# Patient Record
Sex: MALE | Race: OTHER | HISPANIC OR LATINO | Employment: FULL TIME | URBAN - METROPOLITAN AREA
[De-identification: names, ages, dates, MRNs, and addresses within clinical notes are randomized per-mention and may not be internally consistent; named-entity substitution may affect disease eponyms.]

---

## 2018-04-18 ENCOUNTER — TRANSCRIBE ORDERS (OUTPATIENT)
Dept: LAB | Facility: HOSPITAL | Age: 48
End: 2018-04-18

## 2018-04-18 ENCOUNTER — APPOINTMENT (OUTPATIENT)
Dept: LAB | Facility: HOSPITAL | Age: 48
End: 2018-04-18
Payer: COMMERCIAL

## 2018-04-18 DIAGNOSIS — Z00.01 ENCOUNTER FOR GENERAL ADULT MEDICAL EXAMINATION WITH ABNORMAL FINDINGS: ICD-10-CM

## 2018-04-18 DIAGNOSIS — I10 ESSENTIAL HYPERTENSION, MALIGNANT: ICD-10-CM

## 2018-04-18 DIAGNOSIS — R94.5 NONSPECIFIC ABNORMAL RESULTS OF LIVER FUNCTION STUDY: ICD-10-CM

## 2018-04-18 DIAGNOSIS — E78.00 PURE HYPERCHOLESTEROLEMIA: ICD-10-CM

## 2018-04-18 DIAGNOSIS — R73.9 BLOOD GLUCOSE ELEVATED: ICD-10-CM

## 2018-04-18 DIAGNOSIS — E78.00 PURE HYPERCHOLESTEROLEMIA: Primary | ICD-10-CM

## 2018-04-18 LAB
ALBUMIN SERPL BCP-MCNC: 3.5 G/DL (ref 3.5–5)
ALP SERPL-CCNC: 71 U/L (ref 46–116)
ALT SERPL W P-5'-P-CCNC: 19 U/L (ref 12–78)
ANION GAP SERPL CALCULATED.3IONS-SCNC: 10 MMOL/L (ref 4–13)
AST SERPL W P-5'-P-CCNC: 16 U/L (ref 5–45)
BASOPHILS # BLD AUTO: 0.03 THOUSANDS/ΜL (ref 0–0.1)
BASOPHILS NFR BLD AUTO: 0 % (ref 0–1)
BILIRUB SERPL-MCNC: 0.42 MG/DL (ref 0.2–1)
BUN SERPL-MCNC: 20 MG/DL (ref 5–25)
CALCIUM SERPL-MCNC: 8.7 MG/DL (ref 8.3–10.1)
CHLORIDE SERPL-SCNC: 106 MMOL/L (ref 100–108)
CHOLEST SERPL-MCNC: 158 MG/DL (ref 50–200)
CO2 SERPL-SCNC: 25 MMOL/L (ref 21–32)
CREAT SERPL-MCNC: 1.03 MG/DL (ref 0.6–1.3)
EOSINOPHIL # BLD AUTO: 0.22 THOUSAND/ΜL (ref 0–0.61)
EOSINOPHIL NFR BLD AUTO: 3 % (ref 0–6)
ERYTHROCYTE [DISTWIDTH] IN BLOOD BY AUTOMATED COUNT: 14.6 % (ref 11.6–15.1)
EST. AVERAGE GLUCOSE BLD GHB EST-MCNC: 117 MG/DL
GFR SERPL CREATININE-BSD FRML MDRD: 85 ML/MIN/1.73SQ M
GLUCOSE P FAST SERPL-MCNC: 82 MG/DL (ref 65–99)
HAV IGM SER QL: NORMAL
HBA1C MFR BLD: 5.7 % (ref 4.2–6.3)
HBV CORE IGM SER QL: NORMAL
HBV SURFACE AG SER QL: NORMAL
HCT VFR BLD AUTO: 44.7 % (ref 36.5–49.3)
HCV AB SER QL: NORMAL
HDLC SERPL-MCNC: 48 MG/DL (ref 40–60)
HGB BLD-MCNC: 14.5 G/DL (ref 12–17)
LDLC SERPL CALC-MCNC: 94 MG/DL (ref 0–100)
LYMPHOCYTES # BLD AUTO: 2.75 THOUSANDS/ΜL (ref 0.6–4.47)
LYMPHOCYTES NFR BLD AUTO: 40 % (ref 14–44)
MCH RBC QN AUTO: 27.8 PG (ref 26.8–34.3)
MCHC RBC AUTO-ENTMCNC: 32.4 G/DL (ref 31.4–37.4)
MCV RBC AUTO: 86 FL (ref 82–98)
MONOCYTES # BLD AUTO: 0.62 THOUSAND/ΜL (ref 0.17–1.22)
MONOCYTES NFR BLD AUTO: 9 % (ref 4–12)
NEUTROPHILS # BLD AUTO: 3.24 THOUSANDS/ΜL (ref 1.85–7.62)
NEUTS SEG NFR BLD AUTO: 48 % (ref 43–75)
NONHDLC SERPL-MCNC: 110 MG/DL
NRBC BLD AUTO-RTO: 0 /100 WBCS
PLATELET # BLD AUTO: 286 THOUSANDS/UL (ref 149–390)
PMV BLD AUTO: 11 FL (ref 8.9–12.7)
POTASSIUM SERPL-SCNC: 3.9 MMOL/L (ref 3.5–5.3)
PROT SERPL-MCNC: 7.6 G/DL (ref 6.4–8.2)
RBC # BLD AUTO: 5.22 MILLION/UL (ref 3.88–5.62)
SODIUM SERPL-SCNC: 141 MMOL/L (ref 136–145)
TRIGL SERPL-MCNC: 81 MG/DL
WBC # BLD AUTO: 6.86 THOUSAND/UL (ref 4.31–10.16)

## 2018-04-18 PROCEDURE — 80061 LIPID PANEL: CPT

## 2018-04-18 PROCEDURE — 83036 HEMOGLOBIN GLYCOSYLATED A1C: CPT

## 2018-04-18 PROCEDURE — 85025 COMPLETE CBC W/AUTO DIFF WBC: CPT

## 2018-04-18 PROCEDURE — 36415 COLL VENOUS BLD VENIPUNCTURE: CPT

## 2018-04-18 PROCEDURE — 80074 ACUTE HEPATITIS PANEL: CPT

## 2018-04-18 PROCEDURE — 80053 COMPREHEN METABOLIC PANEL: CPT

## 2018-06-27 ENCOUNTER — OFFICE VISIT (OUTPATIENT)
Dept: FAMILY MEDICINE CLINIC | Facility: CLINIC | Age: 48
End: 2018-06-27
Payer: COMMERCIAL

## 2018-06-27 VITALS
SYSTOLIC BLOOD PRESSURE: 126 MMHG | DIASTOLIC BLOOD PRESSURE: 80 MMHG | TEMPERATURE: 98.3 F | HEIGHT: 67 IN | OXYGEN SATURATION: 95 % | WEIGHT: 194 LBS | BODY MASS INDEX: 30.45 KG/M2 | RESPIRATION RATE: 16 BRPM | HEART RATE: 72 BPM

## 2018-06-27 DIAGNOSIS — J30.2 SEASONAL ALLERGIC RHINITIS, UNSPECIFIED TRIGGER: Primary | ICD-10-CM

## 2018-06-27 PROCEDURE — 1036F TOBACCO NON-USER: CPT | Performed by: FAMILY MEDICINE

## 2018-06-27 PROCEDURE — 99213 OFFICE O/P EST LOW 20 MIN: CPT | Performed by: FAMILY MEDICINE

## 2018-06-27 PROCEDURE — 3008F BODY MASS INDEX DOCD: CPT | Performed by: FAMILY MEDICINE

## 2018-06-27 RX ORDER — FLUTICASONE PROPIONATE 50 MCG
1 SPRAY, SUSPENSION (ML) NASAL DAILY
Qty: 16 G | Refills: 0 | Status: SHIPPED | OUTPATIENT
Start: 2018-06-27 | End: 2018-12-05 | Stop reason: SDUPTHER

## 2018-06-27 RX ORDER — PREDNISONE 20 MG/1
20 TABLET ORAL DAILY
Qty: 10 TABLET | Refills: 0 | Status: SHIPPED | OUTPATIENT
Start: 2018-06-27 | End: 2019-08-13 | Stop reason: ALTCHOICE

## 2018-06-27 NOTE — PROGRESS NOTES
Assessment/Plan:    No problem-specific Assessment & Plan notes found for this encounter  Diagnoses and all orders for this visit:    Seasonal allergic rhinitis, unspecified trigger  Comments:  Patient is suffering of seasonal allergies  I recommended him to get saline solution to clean his sinus  And use prescribed medication fluticasone /prednisone  Orders:  -     predniSONE 20 mg tablet; Take 1 tablet (20 mg total) by mouth daily  -     fluticasone (FLONASE) 50 mcg/act nasal spray; 1 spray into each nostril daily          Subjective:      Patient ID: Elder Higuera is a 51 y o  male  Pt c/o nasal congestion, sinus pressure and cough for past two weeks  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        The following portions of the patient's history were reviewed and updated as appropriate: allergies, current medications, past family history, past medical history, past social history, past surgical history and problem list     Review of Systems   Constitutional: Positive for fatigue  Negative for activity change and chills  HENT: Positive for congestion, postnasal drip, rhinorrhea, sinus pain, sinus pressure and sore throat  Eyes: Negative for pain, discharge and itching  Respiratory: Positive for cough, shortness of breath and wheezing  Cardiovascular: Negative  Skin: Negative  Allergic/Immunologic: Positive for environmental allergies  Negative for food allergies and immunocompromised state  Neurological: Positive for headaches  Objective:      /80 (BP Location: Left arm, Patient Position: Sitting, Cuff Size: Standard)   Pulse 72   Temp 98 3 °F (36 8 °C) (Oral)   Resp 16   Ht 5' 7" (1 702 m)   Wt 88 kg (194 lb)   SpO2 95%   BMI 30 38 kg/m²          Physical Exam   Constitutional: He is oriented to person, place, and time  He appears well-developed and well-nourished  HENT:   Head: Normocephalic     Right Ear: Hearing normal    Left Ear: Tympanic membrane normal    Nose: Mucosal edema and rhinorrhea present  No nasal deformity, septal deviation or nasal septal hematoma  Mouth/Throat: Mucous membranes are normal  Oropharyngeal exudate present  Eyes: Pupils are equal, round, and reactive to light  Right eye exhibits no discharge  Left eye exhibits no discharge  Neck: Normal range of motion  Cardiovascular: Normal rate and regular rhythm  Pulmonary/Chest: Effort normal and breath sounds normal  No respiratory distress  He has no wheezes  Neurological: He is alert and oriented to person, place, and time  He has normal reflexes  Skin: Skin is warm and dry

## 2018-12-05 ENCOUNTER — OFFICE VISIT (OUTPATIENT)
Dept: FAMILY MEDICINE CLINIC | Facility: CLINIC | Age: 48
End: 2018-12-05
Payer: COMMERCIAL

## 2018-12-05 VITALS
OXYGEN SATURATION: 97 % | HEART RATE: 64 BPM | DIASTOLIC BLOOD PRESSURE: 80 MMHG | WEIGHT: 196.4 LBS | TEMPERATURE: 96.8 F | SYSTOLIC BLOOD PRESSURE: 130 MMHG | RESPIRATION RATE: 18 BRPM | BODY MASS INDEX: 29.77 KG/M2 | HEIGHT: 68 IN

## 2018-12-05 DIAGNOSIS — J01.01 ACUTE RECURRENT MAXILLARY SINUSITIS: Primary | ICD-10-CM

## 2018-12-05 DIAGNOSIS — J30.2 SEASONAL ALLERGIC RHINITIS, UNSPECIFIED TRIGGER: ICD-10-CM

## 2018-12-05 DIAGNOSIS — Z23 NEEDS FLU SHOT: ICD-10-CM

## 2018-12-05 PROCEDURE — 90471 IMMUNIZATION ADMIN: CPT

## 2018-12-05 PROCEDURE — 99213 OFFICE O/P EST LOW 20 MIN: CPT | Performed by: NURSE PRACTITIONER

## 2018-12-05 PROCEDURE — 90682 RIV4 VACC RECOMBINANT DNA IM: CPT

## 2018-12-05 PROCEDURE — 3008F BODY MASS INDEX DOCD: CPT | Performed by: NURSE PRACTITIONER

## 2018-12-05 RX ORDER — AMOXICILLIN AND CLAVULANATE POTASSIUM 875; 125 MG/1; MG/1
1 TABLET, FILM COATED ORAL EVERY 12 HOURS SCHEDULED
Qty: 14 TABLET | Refills: 0 | Status: SHIPPED | OUTPATIENT
Start: 2018-12-05 | End: 2018-12-12

## 2018-12-05 RX ORDER — FLUTICASONE PROPIONATE 50 MCG
1 SPRAY, SUSPENSION (ML) NASAL DAILY
Qty: 16 G | Refills: 0 | Status: SHIPPED | OUTPATIENT
Start: 2018-12-05 | End: 2019-08-13 | Stop reason: ALTCHOICE

## 2018-12-05 NOTE — PATIENT INSTRUCTIONS
Rinosinusitis   LO QUE NECESITA SABER:   ¿Qué es la rinosinusitis? La rinosinusitis (RS) es la inflamación de yeni fosas nasales y senos paranasales  Por lo general, comienza lety un virus, frecuentemente lety un resfriado común  Los virus normalmente richardson entre 7 y 8 días y no necesitan tratamiento  Cuando el virus no se mejora por sí solo, usted podría tener rinosinusitis bacteriana  [place] significa que ha comenzado a crecer bacteria dentro de yeni senos paranasales  La RS aguda dura menos de 4 semanas  La RS crónica dura más de 12 semanas  La RS recurrente es cuando usted tiene 4 o más episodios de rinosinusitis en 1 año  ¿Cuáles son los signos y síntomas de la rinosinusitis? Yeni signos y síntomas podrían empeorar cuando usted se [place] sobre schwartz espalda o cuando trata de dormir  Puede presentar cualquiera de los siguientes signos o síntomas:  · Congestión nasal y pérdida del sentido del olfato    · Flujo nasal espeso con mucosidad amarilla o desean    · Sensación de presión o dolor en schwartz jewel, o dolor de reba    · Dolor en los dientes o mal aliento     · Dolor o presión en el oído     · Fiebre o tos    · Cansancio  ¿Cómo se diagnostica la rinosinusitis? Schwartz médico le palpará los senos paranasales y le revisará yeni ojos, [place], saraia y oídos  Le preguntará acerca de yeni síntomas y por cuánto Nikhil and Sims goldstein tenido  Infórmele si yeni síntomas caraballo kaden o empeorado desde que empezaron  Es posible que [place] de mucosidad de schwartz nariz muestre qué germen está provocando schwartz infección  Si usted tiene RS crónica, es posible que necesite exámenes de imágenes  ¿Cómo se trata la rinosinusitis? Yeni síntomas usualmente desaparecen por [employer]  Es posible que usted necesite alguno de los siguientes:  · El acetaminofén  gayle el dolor y baja la fiebre  Está disponible sin receta médica  Pregunte la cantidad y la frecuencia con que debe tomarlos  [address]   El acetaminofén puede causar daño en el hígado cuando no se bharat de forma correcta  · AINEs (Analgésicos antiinflamatorios no esteroides) lety el ibuprofeno, ayudan a disminuir la inflamación, el dolor y la [place]  Mechelle medicamento esta disponible con o sin aracelis receta médica  Los AINEs pueden causar sangrado estomacal o problemas renales en ciertas personas  Si usted bharat un medicamento anticoagulante, siempre pregúntele a orona médico si los RONNI son seguros para usted  Siempre uriel la etiqueta de mechelle medicamento y [place] instrucciones  · Los aerosoles nasales con esteroides  disminuyen la inflamación de orona nariz y de los senos paranasales  · Los descongestionantes  reducen la inflamación y despejan la mucosidad de la nariz y los senos paranasales  Los descongestionantes podrían ayudarle a respirar más fácilmente  · Los antihistamínicos  secar la mucosidad en orona nariz y [employer] estornudos  · Antibióticos  sirven para tratar aracelis infección bacteriana y se podrían necesitar si los síntomas no mejoran o se [place]  ¿Cómo puedo controlar los síntomas? · Enjuague yeni senos paranasales  Use un aparato para enjuagar yeni fosas nasales con aracelis solución salina (agua salada)  [place] ayudará a diluir la mucosidad en la nariz eliminando el polen y la suciedad  También ayudará a reducir la inflamación para que usted pueda respirar normalmente  Pregunte a orona médico con qué frecuencia hacerlo  · Respire vapor  [place] agua en un sartén hasta que salga vapor  Inclínese sobre el cuenco y amauri aracelis carpa con aracelis toalla douglas  Respire profundamente por aproximadamente 20 minutos  Tenga cuidado de no acercarse demasiado al vapor o de quemarse  Amauri esto 3 veces al día  Usted también puede respirar profundamente mientras bharat aracelis ducha caliente  · Duerma con la [place]  Coloque aracelis almohada adicional debajo de orona reba antes de dormir para ayudar a drenar yeni senos paranasales  · [address] se le haya indicado    Pregunte a rodriguez médico sobre la cantidad de líquido que necesita belinda todos los días y cuáles le recomienda  Los líquidos van a diluir la mucosidad en rodriguez [facility] y [facility] a drenarla  Evite las bebidas que contienen alcohol o cafeína  · No fume y evite el humo de [place]  La nicotina y otros químicos en los cigarrillos y cigarros pueden empeorar judi síntomas  Pida información a rodriguez médico si usted actualmente fuma y necesita ayuda para dejar de fumar  Los cigarrillos electrónicos o tabaco sin humo todavía contienen nicotina  Consulte con rodriguez médico antes de [employer]  ¿Cuándo westley buscar atención inmediata? · Rodriguez sowmya y párpado están enrojecidos, inflamados y adoloridos  · Usted no puede abrir rodriguez sowmya  · Usted tiene visión doble o no ve  · Rodriguez globo ocular sobresale o usted no puede  rodriguez sowmya  · Usted tiene más sueño de lo normal o nota cambios en rodriguez habilidad de pensar, moverse o hablar  · Usted tiene el noble rígido, fiebre o un shay dolor de reba  · Usted tiene inflamada la frente o el cuero cabelludo  ¿Cuándo west[place] comunicarme con mi médico?   · Judi síntomas empeoran o no mejoran después de 3 a 5 días de [place]  · Usted tiene preguntas o inquietudes acerca de rodriguez condición o cuidado  ACUERDOS SOBRE RODRIGUEZ CUIDADO:   Usted tiene el derecho de ayudar a planear rodriguez cuidado  Aprenda todo lo que pueda sobre rodriguez condición y lety darle tratamiento  Discuta judi opciones de tratamiento con judi médicos para decidir el cuidado que usted desea recibir  Usted siempre tiene el derecho de rechazar el tratamiento  Esta información es sólo para uso en educación  Rodriguez intención no es darle un consejo médico sobre enfermedades o tratamientos  Colsulte con rodriguez Bernadine Davis farmacéutico antes de seguir cualquier régimen médico para saber si es seguro y efectivo para usted    © 2017 [address] Information is for End User's use only and may not be sold, redistributed or otherwise used for commercial purposes  All illustrations and images included in CareNotes® are the copyrighted property of A D A M , Inc  or Damián Blair

## 2018-12-05 NOTE — PROGRESS NOTES
Assessment/Plan:    Acute recurrent maxillary sinusitis  I am prescribing an antibiotic along with Flonase nasal spray  Pt recommended to use neti pot to help with sinus pressure  Also recommended use of humidifier at night to ease with breathing  recommend analgesics, topical intranasal steroids, and/or nasal saline irrigation for symptomatic relief of viral rhinosinusitis  Patient to return to clinic if s/s do not improve  Diagnoses and all orders for this visit:    Acute recurrent maxillary sinusitis  -     amoxicillin-clavulanate (AUGMENTIN) 875-125 mg per tablet; Take 1 tablet by mouth every 12 (twelve) hours for 7 days    Seasonal allergic rhinitis, unspecified trigger  Comments:  Patient is suffering of seasonal allergies  I recommended him to get saline solution to clean his sinus  And use prescribed medication fluticasone /prednisone  Orders:  -     fluticasone (FLONASE) 50 mcg/act nasal spray; 1 spray into each nostril daily    Needs flu shot  -     PREFERRED: influenza vaccine, [id number], quadrivalent, recombinant, PF, 0 5 mL (FLUBLOK)          Subjective:      Patient ID: Patti Escobar is a 51 y o  male  51year old male patient presents today for a sick visit  States he suffers from sinus infections  Congested nasally but denies headaches  Admits to maxillary sinus pressure  Denies a cough or a fever  Denies ear congestion  Admits to post nasal drip but denies sore throat  This weather makes his sinuses recur  Currently using over the counter nasal spray  Ongoing for more than a week  The following portions of the patient's history were reviewed and updated as appropriate: allergies, current medications, past family history, past medical history, past social history, past surgical history and problem list     Review of Systems   Constitutional: Negative  Negative for fatigue and fever  HENT: Positive for congestion, postnasal drip, sinus pain and sinus pressure   Negative for sore throat and trouble swallowing  Eyes: Negative  Respiratory: Negative  Negative for cough and shortness of breath  Cardiovascular: Negative  Negative for chest pain and palpitations  Gastrointestinal: Negative  Endocrine: Negative  Musculoskeletal: Negative  Psychiatric/Behavioral: Negative  Objective:      /80   Pulse 64   Temp (!) 96 8 °F (36 °C) (Temporal)   Resp 18   Ht 5' 8" (1 727 m)   Wt 89 1 kg (196 lb 6 4 oz)   SpO2 97%   BMI 29 86 kg/m²          Physical Exam   Constitutional: He is oriented to person, place, and time  He appears well-developed and well-nourished  HENT:   Head: Normocephalic and atraumatic  Right Ear: Hearing, tympanic membrane, external ear and ear canal normal    Left Ear: Hearing, tympanic membrane, external ear and ear canal normal    Nose: Mucosal edema present  Right sinus exhibits maxillary sinus tenderness  Right sinus exhibits no frontal sinus tenderness  Left sinus exhibits maxillary sinus tenderness  Left sinus exhibits no frontal sinus tenderness  Mouth/Throat: Uvula is midline and mucous membranes are normal  Posterior oropharyngeal erythema present  Cardiovascular: Normal rate, regular rhythm and normal heart sounds  Pulmonary/Chest: Effort normal and breath sounds normal  No respiratory distress  Abdominal: Soft  Bowel sounds are normal    Neurological: He is alert and oriented to person, place, and time  He has normal reflexes  Skin: Skin is warm and dry  Psychiatric: He has a normal mood and affect  Thought content normal    Nursing note and vitals reviewed

## 2018-12-05 NOTE — ASSESSMENT & PLAN NOTE
I am prescribing an antibiotic along with Flonase nasal spray  Pt recommended to use neti pot to help with sinus pressure  Also recommended use of humidifier at night to ease with breathing  recommend analgesics, topical intranasal steroids, and/or nasal saline irrigation for symptomatic relief of viral rhinosinusitis  Patient to return to clinic if s/s do not improve

## 2019-08-13 ENCOUNTER — OFFICE VISIT (OUTPATIENT)
Dept: FAMILY MEDICINE CLINIC | Facility: CLINIC | Age: 49
End: 2019-08-13
Payer: COMMERCIAL

## 2019-08-13 VITALS
TEMPERATURE: 98 F | SYSTOLIC BLOOD PRESSURE: 120 MMHG | HEART RATE: 67 BPM | DIASTOLIC BLOOD PRESSURE: 70 MMHG | BODY MASS INDEX: 28.64 KG/M2 | HEIGHT: 68 IN | WEIGHT: 189 LBS | RESPIRATION RATE: 16 BRPM | OXYGEN SATURATION: 97 %

## 2019-08-13 DIAGNOSIS — M54.50 ACUTE BILATERAL LOW BACK PAIN WITHOUT SCIATICA: Primary | ICD-10-CM

## 2019-08-13 PROCEDURE — 1036F TOBACCO NON-USER: CPT | Performed by: FAMILY MEDICINE

## 2019-08-13 PROCEDURE — 3008F BODY MASS INDEX DOCD: CPT | Performed by: FAMILY MEDICINE

## 2019-08-13 PROCEDURE — 99213 OFFICE O/P EST LOW 20 MIN: CPT | Performed by: FAMILY MEDICINE

## 2019-08-13 RX ORDER — DICLOFENAC SODIUM 75 MG/1
75 TABLET, DELAYED RELEASE ORAL 2 TIMES DAILY
Qty: 30 TABLET | Refills: 0 | Status: SHIPPED | OUTPATIENT
Start: 2019-08-13 | End: 2019-09-25 | Stop reason: SDUPTHER

## 2019-08-13 NOTE — PROGRESS NOTES
Assessment/Plan:  1  Acute bilateral low back pain without sciatica  The choice of NSAID's in this patient depends of her current pain syndrome  I explained to patient that response of NSAIDs varies between patient's and also is differs in regarding to the area of the body that is affected in the progression of the damage  The drug interactions and comorbidities affected  by these medications were explained to the patient also; the caution in toxicity in the GI tract was also discussed  Prevent the long-term use of these medications may be wise  The use ice and physical therapy is necessary in order to get the best results  NSAID's might elevate BP, or block effect of certain antihypertensive agents  It is to used with caution  Always use ice and therapy to decrease or avoid the need for a Pharmacological agent  - diclofenac (VOLTAREN) 75 mg EC tablet; Take 1 tablet (75 mg total) by mouth 2 (two) times a day  Dispense: 30 tablet; Refill: 0  BMI Counseling: Body mass index is 28 74 kg/m²  The BMI is above normal  Exercise recommendations include exercising 3-5 times per week  No problem-specific Assessment & Plan notes found for this encounter  Diagnoses and all orders for this visit:    Acute bilateral low back pain without sciatica  -     diclofenac (VOLTAREN) 75 mg EC tablet; Take 1 tablet (75 mg total) by mouth 2 (two) times a day          Subjective:      Patient ID: Mckenzie Perea is a 47 y o  male  Mckenzie Perea 47 y o  complaining of   Acute pain in lower back; started about 2 week(s) ago  The pain is  continuous and has been gradually worsening since onset  The quality of the pain is described as aching  The pain does radiate down both buttocks  The pain is at a severity of 5/10  The symptoms are aggravated by movement  Associated symptoms include numbness, paresthesias and tingling  he does not have  tried any medications           The following portions of the patient's history were reviewed and updated as appropriate: allergies, current medications, past family history, past medical history, past social history, past surgical history and problem list     Review of Systems   Constitutional: Negative for diaphoresis, fatigue, fever and unexpected weight change  Respiratory: Negative for apnea, cough, choking, chest tightness and shortness of breath  Cardiovascular: Negative for chest pain, palpitations and leg swelling  Gastrointestinal: Negative for abdominal distention, abdominal pain, anal bleeding, blood in stool and constipation  Musculoskeletal: Positive for back pain (Worsening a work, he works as  doing lot of bendings  )  Negative for arthralgias, gait problem and joint swelling  Neurological: Negative for dizziness, facial asymmetry, light-headedness and headaches  Psychiatric/Behavioral: Negative for behavioral problems, dysphoric mood and self-injury  The patient is not nervous/anxious  Objective:      /70 (BP Location: Left arm, Patient Position: Sitting, Cuff Size: Standard)   Pulse 67   Temp 98 °F (36 7 °C) (Oral)   Resp 16   Ht 5' 8" (1 727 m)   Wt 85 7 kg (189 lb)   SpO2 97%   BMI 28 74 kg/m²          Physical Exam   Constitutional: No distress  HENT:   Nose: Nose normal    Mouth/Throat: Oropharynx is clear and moist    Eyes: Pupils are equal, round, and reactive to light  Conjunctivae are normal    Neck: Normal range of motion  No thyromegaly present  Cardiovascular: Normal rate, regular rhythm and normal heart sounds  Exam reveals no friction rub  No murmur heard  Pulmonary/Chest: Effort normal and breath sounds normal  No stridor  No respiratory distress  Musculoskeletal: He exhibits no edema, tenderness or deformity  Lumbar back: He exhibits spasm (Any increased heat in lower area  )  Neurological: He displays normal reflexes  No cranial nerve deficit  He exhibits normal muscle tone   Coordination normal    Skin: He is not diaphoretic

## 2019-08-13 NOTE — PATIENT INSTRUCTIONS
Diclofenac (Por la boca)   Trata el dolor  También los lucero de [place]  Mechelle es un medicamento antiinflamatorio no esteroideo (RONNI)  Gabino(s) : Cambia, Cataflam, DermaSilkRx Anodynexa [place], DermaSilkRx Diclo [place], DermacinRx Seth, Cumeira, NuDiclo TabPAK, PrevidolRx Analgesic [place], PrevidolRx Plus Analgesic [place], Voltaren, Zipsor, Zorvolex   Existen muchas otras marcas de [employer]  Mechelle medicamento no debe ser usado cuando:   Mechelle medicamento no es adecuado para todas las personas  No lo use si usted ha tenido aracelis reacción alérgica al diclofenaco, a la aspirina o a algún otro analgésico RONNI  Forma de usar mechelle medicamento:   Leona Cintron, Jayde Holt recubierta, Tableta de liberación prolongada  · Schwartz médico le indicara cuanto medicamento necesita usar  No use más medicamento de lo indicado  · [employer] debe venir con Jen Otero del medicamento  Solicite aracelis copia con schwartz farmacéutico en [place] de no tener la guía  · Solución oral: Mezcle el contenido del paquete con aracelis 1 a 2 onzas de agua (30 a 60 mL)  No use ningún otro líquido que no sea [place] agua para mezclar el medicamento  Mezcle javed y michael de inmediato con el estómago vacío  · Si olvida aracelis dosis: Si olvida aracelis dosis de schwartz medicamento, tómelo lo más pronto posible  Si es ramiro la hora para schwartz próxima dosis, espere hasta entonces para belinda schwartz dosis regular  No use medicamento adicional para reponer la dosis olvidada  · Guarde el medicamento en un recipiente cerrado a temperatura ambiente y alejado del calor, la humedad y la jas directa     Medicamentos y alimentos que debe evitar:   Consulte con schwartz médico o farmacéutico antes de usar cualquier medicamento, incluyendo los que compra sin receta médica, las vitaminas y los productos herbales  · No use ningún otro RONNI a no ser que schwartz médico lo autorice   Algunos otros nombres para estos medicamentos analgésico son:aspirina, diflunisal, ibuprofeno, naproxeno o salsalato  · Algunos alimentos y medicamentos pueden afectar la efectividad del diclofenaco  Informe a u médico si usted también está usando alguno de los siguientes medicamentos:  ¨ Ciclosporina, digoxina, litio, metotrexato, pemetrexed  ¨ Medicamentos para la presión arterial  ¨ Anticoagulante (lety la warfarina)  ¨ Diurético  ¨ Medicamento para tratar la depresión  ¨ Medicamentos esteroideos  Precauciones marco el uso de mechelle medicamento:   · Informe a orona médico si está embarazada o amamantando  No use mechelle medicamento marco la última etapa del embarazo a menos que sea indicado por orona médico   · Infórmele a orona médico si usted tiene enfermedad en los riñones o hígado, asma, insuficiencia cardíaca, presión arterial shannon o problemas con los vasos sanguíneo o un historial de úlceras estomacal o problemas de sangrado  Informe a orona médico si usted tiene fenilcetonuria (PKU)  Infórmele también a orona médico si usted bharat alcohol  · Mechelle medicamento puede causar los siguientes problemas:  ¨ Mayor riesgo de sufrir un ataque cardíaco, insuficiencia cardíaca o derrame cerebral  ¨ Sangrado estomacal o intestinal  ¨ Problemas hepáticos  ¨ Reacciones graves en la piel  · Los lucero de reba podrían empeorar si usted [place] medicamentos para el dolor de reba marco 10 neida o más por mes  Anote la frecuencia de yeni lucero de reba y la frecuencia con la que usted [place] mechelle medicamento  · El médico solicitará exámenes de laboratorio marco las citas de rutina para revisar los efectos de Jennifer  Asista a todas yeni citas  · Guarde todos los medicamentos fuera del alcance de los niños  Nunca comparta yeni medicamentos con [facility]    Efectos secundarios que pueden presentarse marco el uso de mechelle medicamento:   Consulte inmediatamente con el médico si nota cualquiera de estos efectos secundarios:  · Reacción alérgica: Comezón o ronchas, hinchazón del priti o las edd, hinchazón u hormigueo en la boca o garganta, opresión en el pecho, dificultad para respirar  · Ampollas, despelleje, o sarpullido florian en la piel  · Heces con sanjuana o negras y alquitranadas, shay dolor de BJ[place]OLM, vomita sanjuana o material que parecen granos de café  · [employer] en la cantidad y frecuencia con la que orina  · Dolor en el pecho que podría propagarse a yeni brazos, [place], espalda o noble, presentar dificultad para respirar, sudor inusual, desmayos  · [employer] o heces pálidas, náuseas, vómitos, falta de apetito, dolor estomacal, coloración amarillenta en la piel u ojos  · Adormecimiento o debilidad en schwartz brazo o pierna, o en un lado de schwartz cuerpo, dolor en la parte inferior de schwartz pierna, dolor de reba shay o súbito o problemas con la visión, el habla, o para caminar  · Aumento rápido de peso, inflamación en yeni edd, tobillos, o pies  Consulte con el médico si nota los siguientes efectos secundarios menos graves:   · Estreñimiento o diarrea  · Dolor de reba moderado  Consulte con el médico si nota otros efectos secundarios que sabina son causados por lesley medicamento  Llame a schwartz médico para consultarle López Rodriguez puede notificar yeni efectos secundarios al FDA al [id number]  © 2017 [address] Aguilar Information is for End User's use only and may not be sold, redistributed or otherwise used for commercial purposes  Esta información es sólo para uso en educación  Schwartz intención no es darle un consejo médico sobre enfermedades o tratamientos  Colsulte con schwartz [employer] farmacéutico antes de seguir cualquier régimen médico para saber si es seguro y efectivo para usted

## 2019-09-25 ENCOUNTER — OFFICE VISIT (OUTPATIENT)
Dept: FAMILY MEDICINE CLINIC | Facility: CLINIC | Age: 49
End: 2019-09-25
Payer: COMMERCIAL

## 2019-09-25 VITALS
BODY MASS INDEX: 29.55 KG/M2 | OXYGEN SATURATION: 97 % | DIASTOLIC BLOOD PRESSURE: 70 MMHG | SYSTOLIC BLOOD PRESSURE: 120 MMHG | WEIGHT: 195 LBS | RESPIRATION RATE: 16 BRPM | HEIGHT: 68 IN | HEART RATE: 81 BPM | TEMPERATURE: 98.2 F

## 2019-09-25 DIAGNOSIS — M54.50 ACUTE BILATERAL LOW BACK PAIN WITHOUT SCIATICA: ICD-10-CM

## 2019-09-25 DIAGNOSIS — Z23 NEEDS FLU SHOT: ICD-10-CM

## 2019-09-25 DIAGNOSIS — Z12.11 COLON CANCER SCREENING: ICD-10-CM

## 2019-09-25 DIAGNOSIS — Z00.01 ENCOUNTER FOR GENERAL ADULT MEDICAL EXAMINATION WITH ABNORMAL FINDINGS: Primary | ICD-10-CM

## 2019-09-25 DIAGNOSIS — I83.93 ASYMPTOMATIC VARICOSE VEINS OF BOTH LOWER EXTREMITIES: ICD-10-CM

## 2019-09-25 PROCEDURE — 99396 PREV VISIT EST AGE 40-64: CPT | Performed by: FAMILY MEDICINE

## 2019-09-25 PROCEDURE — 90682 RIV4 VACC RECOMBINANT DNA IM: CPT | Performed by: FAMILY MEDICINE

## 2019-09-25 PROCEDURE — 90471 IMMUNIZATION ADMIN: CPT | Performed by: FAMILY MEDICINE

## 2019-09-25 RX ORDER — DICLOFENAC SODIUM 75 MG/1
75 TABLET, DELAYED RELEASE ORAL 2 TIMES DAILY
Qty: 60 TABLET | Refills: 0 | Status: SHIPPED | OUTPATIENT
Start: 2019-09-25 | End: 2020-10-02 | Stop reason: ALTCHOICE

## 2019-09-25 NOTE — ASSESSMENT & PLAN NOTE
During this visit we have a goal to personalize prevention  I discussed the patient about - chronic back pain-, and he takes the) act off and on, requesting medication for the acute exacerbations,  the need for a life style plan and decrease the impact of current problems  Health risk assessment was discussed with patient also and the ways to stay healthier  We reviewed also the current medications, the need to avoid polypharmacy in his current treatment; also about how the chronic conditions are impacting now and later  Recommended a healthy diet and exercising frequently will help to control better patient's current chronic conditions;  Immunizations, and the need to compliance with current CDC's recommendations  Patient declined at this time advanced directives  I encouraged against the use alcohol, tobacco, recreational illegal prescribed and non-prescribed drugs, Smoking status Not applicable and the use of cell phone while driving, safe sex

## 2019-09-25 NOTE — PROGRESS NOTES
Assessment/Plan:    Colon cancer screening  During this visit we have a goal to personalize prevention  I discussed the patient about - chronic back pain-, and he takes the) act off and on, requesting medication for the acute exacerbations,  the need for a life style plan and decrease the impact of current problems  Health risk assessment was discussed with patient also and the ways to stay healthier  We reviewed also the current medications, the need to avoid polypharmacy in his current treatment; also about how the chronic conditions are impacting now and later  Recommended a healthy diet and exercising frequently will help to control better patient's current chronic conditions;  Immunizations, and the need to compliance with current CDC's recommendations  Patient declined at this time advanced directives  I encouraged against the use alcohol, tobacco, recreational illegal prescribed and non-prescribed drugs, Smoking status Not applicable and the use of cell phone while driving, safe sex  Diagnoses and all orders for this visit:    Encounter for general adult medical examination with abnormal findings  -     Comprehensive metabolic panel; Future  -     Lipid panel; Future    Needs flu shot  -     influenza vaccine, [id number], quadrivalent, recombinant, PF, 0 5 mL, for patients 18 yr+ (FLUBLOK)    Colon cancer screening  -     Ambulatory referral to Gastroenterology; Future    Acute bilateral low back pain without sciatica  -     diclofenac (VOLTAREN) 75 mg EC tablet; Take 1 tablet (75 mg total) by mouth 2 (two) times a day    Asymptomatic varicose veins of both lower extremities    Other orders  -     Cancel: CBC and differential; Future  -     Cancel: HEMOGLOBIN A1C W/ EAG ESTIMATION; Future          Subjective:      Patient ID: Juanita Rice is a 47 y o  male  Patient is here for PE, not having any acute distress        The following portions of the patient's history were reviewed and updated as appropriate: allergies, current medications, past family history, past medical history, past social history, past surgical history and problem list     Review of Systems   Constitutional: Negative for chills, diaphoresis, fatigue and fever  HENT: Negative for hearing loss, sinus pressure, sore throat and trouble swallowing  Eyes: Negative for photophobia, pain, redness and visual disturbance  Respiratory: Negative for cough, choking, chest tightness and shortness of breath  Cardiovascular: Negative for chest pain, palpitations and leg swelling  Gastrointestinal: Negative for abdominal pain  Genitourinary: Negative for difficulty urinating, dysuria, enuresis and flank pain  Musculoskeletal: Negative for arthralgias, back pain, gait problem and joint swelling  Neurological: Negative for dizziness, facial asymmetry, light-headedness and headaches  Psychiatric/Behavioral: Negative for agitation, behavioral problems, confusion and decreased concentration  Objective:      /70 (BP Location: Left arm, Patient Position: Sitting, Cuff Size: Standard)   Pulse 81   Temp 98 2 °F (36 8 °C) (Oral)   Resp 16   Ht 5' 8" (1 727 m)   Wt 88 5 kg (195 lb)   SpO2 97%   BMI 29 65 kg/m²          Physical Exam   Constitutional: No distress  HENT:   Nose: Nose normal    Mouth/Throat: Oropharynx is clear and moist    Eyes: Pupils are equal, round, and reactive to light  Conjunctivae are normal    Neck: Normal range of motion  No thyromegaly present  Cardiovascular: Normal rate, regular rhythm and normal heart sounds  Exam reveals no friction rub  No murmur heard  Pulmonary/Chest: Effort normal and breath sounds normal  No stridor  No respiratory distress  Abdominal: Soft  Bowel sounds are normal    Genitourinary: Prostate normal    Musculoskeletal: He exhibits no edema, tenderness or deformity  Neurological: He is alert  He displays normal reflexes  No cranial nerve deficit   He exhibits normal muscle tone  Coordination normal    Skin: He is not diaphoretic     Evidence of venous insufficiency in both lower extremities

## 2019-09-25 NOTE — PATIENT INSTRUCTIONS
Diclofenac (Por la boca)   Trata el dolor  También los lucero de [place]  Mechelle es un medicamento antiinflamatorio no esteroideo (RONNI)  Gabino(s) : Cambia, Cataflam, DermaSilkRx Anodynexa [place], DermaSilkRx Diclo [place], DermacinRx Seth, Cumeira, NuDiclo TabPAK, PrevidolRx Analgesic [place], PrevidolRx Plus Analgesic [place], Voltaren, Zipsor, Zorvolex   Existen muchas otras marcas de Jennifer  Mechelle medicamento no debe ser usado cuando:   Mechelle medicamento no es adecuado para todas las personas  No lo use si usted ha tenido aracelis reacción alérgica al diclofenaco, a la aspirina o a algún otro analgésico RONNI  Forma de usar mechelle medicamento:   Orma Fan, Forest Lent recubierta, Tableta de liberación prolongada  · Orona médico le indicara cuanto medicamento necesita usar  No use más medicamento de lo indicado  · Jennifer debe venir con [facility] Guía del medicamento  Solicite aracelis copia con orona farmacéutico en [place] de no tener la guía  · Solución oral: Mezcle el contenido del paquete con aracelis 1 a 2 onzas de agua (30 a 60 mL)  No use ningún otro líquido que no sea [place] agua para mezclar el medicamento  Mezcle javed y michael de inmediato con el estómago vacío  · Si olvida aracelis dosis: Si olvida aracelis dosis de orona medicamento, tómelo lo más pronto posible  Si es ramiro la hora para orona próxima dosis, espere hasta entonces para belinda orona dosis regular  No use medicamento adicional para reponer la dosis olvidada  · Guarde el medicamento en un recipiente cerrado a temperatura ambiente y alejado del calor, la humedad y la jas directa     Medicamentos y alimentos que debe evitar:   Consulte con orona médico o farmacéutico antes de usar cualquier medicamento, incluyendo los que compra sin receta médica, las vitaminas y los productos herbales  · No use ningún otro RONNI a no ser que orona médico lo autorice   Algunos otros nombres para estos medicamentos analgésico son:aspirina, diflunisal, ibuprofeno, naproxeno o salsalato  · Algunos alimentos y medicamentos pueden afectar la efectividad del diclofenaco  Informe a u médico si usted también está usando alguno de los siguientes medicamentos:  ¨ Ciclosporina, digoxina, litio, metotrexato, pemetrexed  ¨ Medicamentos para la presión arterial  ¨ Anticoagulante (lety la warfarina)  ¨ Diurético  ¨ Medicamento para tratar la depresión  ¨ Medicamentos esteroideos  Precauciones marco el uso de mechelle medicamento:   · Informe a orona médico si está embarazada o amamantando  No use mechelle medicamento marco la última etapa del embarazo a menos que sea indicado por orona médico   · Infórmele a orona médico si usted tiene enfermedad en los riñones o hígado, asma, insuficiencia cardíaca, presión arterial shannon o problemas con los vasos sanguíneo o un historial de úlceras estomacal o problemas de sangrado  Informe a orona médico si usted tiene fenilcetonuria (PKU)  Infórmele también a orona médico si usted bharat alcohol  · Mechelle medicamento puede causar los siguientes problemas:  ¨ Mayor riesgo de sufrir un ataque cardíaco, insuficiencia cardíaca o derrame cerebral  ¨ Sangrado estomacal o intestinal  ¨ Problemas hepáticos  ¨ Reacciones graves en la piel  · Los lucero de reba podrían empeorar si usted [place] medicamentos para el dolor de reba marco 10 neida o más por mes  Anote la frecuencia de yeni lucero de reba y la frecuencia con la que usted [place] mechelle medicamento  · El médico solicitará exámenes de laboratorio marco las citas de rutina para revisar los efectos de Jennifer  Asista a todas yeni citas  · Guarde todos los medicamentos fuera del alcance de los niños  Nunca comparta yeni medicamentos con [facility]    Efectos secundarios que pueden presentarse marco el uso de mechelle medicamento:   Consulte inmediatamente con el médico si nota cualquiera de estos efectos secundarios:  · Reacción alérgica: Comezón o ronchas, hinchazón del priti o las edd, hinchazón u hormigueo en la boca o garganta, opresión en el pecho, dificultad para respirar  · Ampollas, despelleje, o sarpullido florian en la piel  · Heces con sanjuana o negras y alquitranadas, shay dolor de BJURHOLM, vomita sanjuana o material que parecen granos de café  · [employer] en la cantidad y frecuencia con la que orina  · Dolor en el pecho que podría propagarse a yeni brazos, [place], espalda o noble, presentar dificultad para respirar, sudor inusual, desmayos  · [employer] o heces pálidas, náuseas, vómitos, falta de apetito, dolor estomacal, coloración amarillenta en la piel u ojos  · Adormecimiento o debilidad en schwartz brazo o pierna, o en un lado de schwartz cuerpo, dolor en la parte inferior de schwartz pierna, dolor de reba shay o súbito o problemas con la visión, el habla, o para caminar  · Aumento rápido de peso, inflamación en yeni edd, tobillos, o pies  Consulte con el médico si nota los siguientes efectos secundarios menos graves:   · Estreñimiento o diarrea  · Dolor de reba moderado  Consulte con el médico si nota otros efectos secundarios que sabina son causados por lesley medicamento  Llame a schwartz médico para consultarle López Rodriguez puede notificar yeni efectos secundarios al FDA al [phone]  © 2017 [address] Information is for End User's use only and may not be sold, redistributed or otherwise used for commercial purposes  Esta información es sólo para uso en educación  Schwartz intención no es darle un consejo médico sobre enfermedades o tratamientos  Colsulte con schwartz Stormy Binder farmacéutico antes de seguir cualquier régimen médico para saber si es seguro y efectivo para usted

## 2020-10-02 ENCOUNTER — OFFICE VISIT (OUTPATIENT)
Dept: FAMILY MEDICINE CLINIC | Facility: CLINIC | Age: 50
End: 2020-10-02
Payer: COMMERCIAL

## 2020-10-02 VITALS
TEMPERATURE: 98 F | HEIGHT: 68 IN | SYSTOLIC BLOOD PRESSURE: 126 MMHG | BODY MASS INDEX: 28.79 KG/M2 | DIASTOLIC BLOOD PRESSURE: 70 MMHG | OXYGEN SATURATION: 98 % | RESPIRATION RATE: 16 BRPM | WEIGHT: 190 LBS | HEART RATE: 86 BPM

## 2020-10-02 DIAGNOSIS — Z00.01 ENCOUNTER FOR GENERAL ADULT MEDICAL EXAMINATION WITH ABNORMAL FINDINGS: Primary | ICD-10-CM

## 2020-10-02 DIAGNOSIS — J34.89 NASAL OBSTRUCTION: ICD-10-CM

## 2020-10-02 DIAGNOSIS — Z11.4 SCREENING FOR HUMAN IMMUNODEFICIENCY VIRUS: ICD-10-CM

## 2020-10-02 DIAGNOSIS — Z23 NEED FOR TDAP VACCINATION: ICD-10-CM

## 2020-10-02 DIAGNOSIS — Z23 NEEDS FLU SHOT: ICD-10-CM

## 2020-10-02 DIAGNOSIS — Z12.11 COLON CANCER SCREENING: ICD-10-CM

## 2020-10-02 PROCEDURE — 1036F TOBACCO NON-USER: CPT | Performed by: FAMILY MEDICINE

## 2020-10-02 PROCEDURE — 90682 RIV4 VACC RECOMBINANT DNA IM: CPT | Performed by: FAMILY MEDICINE

## 2020-10-02 PROCEDURE — 90715 TDAP VACCINE 7 YRS/> IM: CPT | Performed by: FAMILY MEDICINE

## 2020-10-02 PROCEDURE — 99396 PREV VISIT EST AGE 40-64: CPT | Performed by: FAMILY MEDICINE

## 2020-10-02 PROCEDURE — 3725F SCREEN DEPRESSION PERFORMED: CPT | Performed by: FAMILY MEDICINE

## 2020-10-02 PROCEDURE — 90471 IMMUNIZATION ADMIN: CPT | Performed by: FAMILY MEDICINE

## 2020-10-02 PROCEDURE — 90472 IMMUNIZATION ADMIN EACH ADD: CPT | Performed by: FAMILY MEDICINE

## 2020-10-27 ENCOUNTER — HOSPITAL ENCOUNTER (OUTPATIENT)
Dept: RADIOLOGY | Facility: HOSPITAL | Age: 50
Discharge: HOME | End: 2020-10-27
Attending: OTOLARYNGOLOGY
Payer: COMMERCIAL

## 2020-10-27 ENCOUNTER — LAB (OUTPATIENT)
Dept: LAB | Facility: HOSPITAL | Age: 50
End: 2020-10-27
Payer: COMMERCIAL

## 2020-10-27 DIAGNOSIS — J32.4 CHRONIC PANSINUSITIS: ICD-10-CM

## 2020-10-27 DIAGNOSIS — J34.89 CONCHA BULLOSA: ICD-10-CM

## 2020-10-27 DIAGNOSIS — Z11.4 SCREENING FOR HUMAN IMMUNODEFICIENCY VIRUS: ICD-10-CM

## 2020-10-27 DIAGNOSIS — Z00.01 ENCOUNTER FOR GENERAL ADULT MEDICAL EXAMINATION WITH ABNORMAL FINDINGS: ICD-10-CM

## 2020-10-27 DIAGNOSIS — J34.2 DEVIATED NASAL SEPTUM: ICD-10-CM

## 2020-10-27 LAB
ALBUMIN SERPL BCP-MCNC: 3.5 G/DL (ref 3.5–5)
ALP SERPL-CCNC: 63 U/L (ref 46–116)
ALT SERPL W P-5'-P-CCNC: 25 U/L (ref 12–78)
ANION GAP SERPL CALCULATED.3IONS-SCNC: 4 MMOL/L (ref 4–13)
AST SERPL W P-5'-P-CCNC: 13 U/L (ref 5–45)
BASOPHILS # BLD AUTO: 0.04 THOUSANDS/ΜL (ref 0–0.1)
BASOPHILS NFR BLD AUTO: 0 % (ref 0–1)
BILIRUB SERPL-MCNC: 0.38 MG/DL (ref 0.2–1)
BUN SERPL-MCNC: 18 MG/DL (ref 5–25)
CALCIUM SERPL-MCNC: 8.9 MG/DL (ref 8.3–10.1)
CHLORIDE SERPL-SCNC: 106 MMOL/L (ref 100–108)
CHOLEST SERPL-MCNC: 183 MG/DL (ref 50–200)
CO2 SERPL-SCNC: 29 MMOL/L (ref 21–32)
CREAT SERPL-MCNC: 0.93 MG/DL (ref 0.6–1.3)
EOSINOPHIL # BLD AUTO: 0.12 THOUSAND/ΜL (ref 0–0.61)
EOSINOPHIL NFR BLD AUTO: 1 % (ref 0–6)
ERYTHROCYTE [DISTWIDTH] IN BLOOD BY AUTOMATED COUNT: 14.7 % (ref 11.6–15.1)
GFR SERPL CREATININE-BSD FRML MDRD: 95 ML/MIN/1.73SQ M
GLUCOSE P FAST SERPL-MCNC: 72 MG/DL (ref 65–99)
HCT VFR BLD AUTO: 46.1 % (ref 36.5–49.3)
HDLC SERPL-MCNC: 70 MG/DL
HGB BLD-MCNC: 14.4 G/DL (ref 12–17)
IMM GRANULOCYTES # BLD AUTO: 0.02 THOUSAND/UL (ref 0–0.2)
IMM GRANULOCYTES NFR BLD AUTO: 0 % (ref 0–2)
LDLC SERPL CALC-MCNC: 81 MG/DL (ref 0–100)
LYMPHOCYTES # BLD AUTO: 4.17 THOUSANDS/ΜL (ref 0.6–4.47)
LYMPHOCYTES NFR BLD AUTO: 48 % (ref 14–44)
MCH RBC QN AUTO: 27.1 PG (ref 26.8–34.3)
MCHC RBC AUTO-ENTMCNC: 31.2 G/DL (ref 31.4–37.4)
MCV RBC AUTO: 87 FL (ref 82–98)
MONOCYTES # BLD AUTO: 0.71 THOUSAND/ΜL (ref 0.17–1.22)
MONOCYTES NFR BLD AUTO: 8 % (ref 4–12)
NEUTROPHILS # BLD AUTO: 3.85 THOUSANDS/ΜL (ref 1.85–7.62)
NEUTS SEG NFR BLD AUTO: 43 % (ref 43–75)
NONHDLC SERPL-MCNC: 113 MG/DL
NRBC BLD AUTO-RTO: 0 /100 WBCS
PLATELET # BLD AUTO: 274 THOUSANDS/UL (ref 149–390)
PMV BLD AUTO: 10.7 FL (ref 8.9–12.7)
POTASSIUM SERPL-SCNC: 3.8 MMOL/L (ref 3.5–5.3)
PROT SERPL-MCNC: 7.7 G/DL (ref 6.4–8.2)
RBC # BLD AUTO: 5.31 MILLION/UL (ref 3.88–5.62)
SODIUM SERPL-SCNC: 139 MMOL/L (ref 136–145)
TRIGL SERPL-MCNC: 160 MG/DL
WBC # BLD AUTO: 8.91 THOUSAND/UL (ref 4.31–10.16)

## 2020-10-27 PROCEDURE — 70486 CT MAXILLOFACIAL W/O DYE: CPT

## 2020-10-27 PROCEDURE — 80061 LIPID PANEL: CPT

## 2020-10-27 PROCEDURE — 36415 COLL VENOUS BLD VENIPUNCTURE: CPT

## 2020-10-27 PROCEDURE — 85025 COMPLETE CBC W/AUTO DIFF WBC: CPT

## 2020-10-27 PROCEDURE — G1004 CDSM NDSC: HCPCS

## 2020-10-27 PROCEDURE — 87389 HIV-1 AG W/HIV-1&-2 AB AG IA: CPT

## 2020-10-27 PROCEDURE — 80053 COMPREHEN METABOLIC PANEL: CPT

## 2020-10-28 LAB — HIV 1+2 AB+HIV1 P24 AG SERPL QL IA: NORMAL

## 2020-10-29 ENCOUNTER — TELEPHONE (OUTPATIENT)
Dept: FAMILY MEDICINE CLINIC | Facility: CLINIC | Age: 50
End: 2020-10-29

## 2020-12-19 ENCOUNTER — OFFICE VISIT (OUTPATIENT)
Dept: URGENT CARE | Age: 50
End: 2020-12-19
Payer: COMMERCIAL

## 2020-12-19 VITALS — OXYGEN SATURATION: 100 % | TEMPERATURE: 97.3 F | RESPIRATION RATE: 16 BRPM | HEART RATE: 82 BPM

## 2020-12-19 DIAGNOSIS — J02.9 SORE THROAT: Primary | ICD-10-CM

## 2020-12-19 DIAGNOSIS — Z20.822 EXPOSURE TO COVID-19 VIRUS: ICD-10-CM

## 2020-12-19 PROCEDURE — U0003 INFECTIOUS AGENT DETECTION BY NUCLEIC ACID (DNA OR RNA); SEVERE ACUTE RESPIRATORY SYNDROME CORONAVIRUS 2 (SARS-COV-2) (CORONAVIRUS DISEASE [COVID-19]), AMPLIFIED PROBE TECHNIQUE, MAKING USE OF HIGH THROUGHPUT TECHNOLOGIES AS DESCRIBED BY CMS-2020-01-R: HCPCS | Performed by: NURSE PRACTITIONER

## 2020-12-19 PROCEDURE — G0382 LEV 3 HOSP TYPE B ED VISIT: HCPCS | Performed by: NURSE PRACTITIONER

## 2020-12-20 LAB — SARS-COV-2 RNA SPEC QL NAA+PROBE: DETECTED

## 2020-12-21 ENCOUNTER — TELEPHONE (OUTPATIENT)
Dept: URGENT CARE | Age: 50
End: 2020-12-21

## 2020-12-22 ENCOUNTER — TELEMEDICINE (OUTPATIENT)
Dept: FAMILY MEDICINE CLINIC | Facility: CLINIC | Age: 50
End: 2020-12-22
Payer: COMMERCIAL

## 2020-12-22 ENCOUNTER — TELEPHONE (OUTPATIENT)
Dept: URGENT CARE | Age: 50
End: 2020-12-22

## 2020-12-22 DIAGNOSIS — U07.1 COVID-19 VIRUS INFECTION: Primary | ICD-10-CM

## 2020-12-22 PROCEDURE — 99213 OFFICE O/P EST LOW 20 MIN: CPT | Performed by: FAMILY MEDICINE

## 2020-12-23 DIAGNOSIS — Z12.11 ENCOUNTER FOR SCREENING COLONOSCOPY: Primary | ICD-10-CM

## 2020-12-29 ENCOUNTER — ANESTHESIA EVENT (OUTPATIENT)
Dept: PERIOP | Facility: AMBULARY SURGERY CENTER | Age: 50
End: 2020-12-29
Payer: COMMERCIAL

## 2021-01-04 ENCOUNTER — LAB (OUTPATIENT)
Dept: LAB | Facility: HOSPITAL | Age: 51
End: 2021-01-04
Attending: OTOLARYNGOLOGY
Payer: COMMERCIAL

## 2021-01-04 DIAGNOSIS — J34.2 DEVIATED NASAL SEPTUM: ICD-10-CM

## 2021-01-04 DIAGNOSIS — J34.3 NASAL TURBINATE HYPERTROPHY: ICD-10-CM

## 2021-01-04 DIAGNOSIS — J32.0 CHRONIC MAXILLARY SINUSITIS: ICD-10-CM

## 2021-01-04 DIAGNOSIS — J34.89 NASAL VALVE COLLAPSE: ICD-10-CM

## 2021-01-04 LAB
ANION GAP SERPL CALCULATED.3IONS-SCNC: 2 MMOL/L (ref 4–13)
BASOPHILS # BLD AUTO: 0.06 THOUSANDS/ΜL (ref 0–0.1)
BASOPHILS NFR BLD AUTO: 1 % (ref 0–1)
BUN SERPL-MCNC: 17 MG/DL (ref 5–25)
CALCIUM SERPL-MCNC: 9.4 MG/DL (ref 8.3–10.1)
CHLORIDE SERPL-SCNC: 106 MMOL/L (ref 100–108)
CO2 SERPL-SCNC: 29 MMOL/L (ref 21–32)
CREAT SERPL-MCNC: 1.03 MG/DL (ref 0.6–1.3)
EOSINOPHIL # BLD AUTO: 0.29 THOUSAND/ΜL (ref 0–0.61)
EOSINOPHIL NFR BLD AUTO: 4 % (ref 0–6)
ERYTHROCYTE [DISTWIDTH] IN BLOOD BY AUTOMATED COUNT: 14.3 % (ref 11.6–15.1)
GFR SERPL CREATININE-BSD FRML MDRD: 84 ML/MIN/1.73SQ M
GLUCOSE P FAST SERPL-MCNC: 91 MG/DL (ref 65–99)
HCT VFR BLD AUTO: 45.4 % (ref 36.5–49.3)
HGB BLD-MCNC: 14.4 G/DL (ref 12–17)
IMM GRANULOCYTES # BLD AUTO: 0.01 THOUSAND/UL (ref 0–0.2)
IMM GRANULOCYTES NFR BLD AUTO: 0 % (ref 0–2)
LYMPHOCYTES # BLD AUTO: 3.33 THOUSANDS/ΜL (ref 0.6–4.47)
LYMPHOCYTES NFR BLD AUTO: 50 % (ref 14–44)
MCH RBC QN AUTO: 26.7 PG (ref 26.8–34.3)
MCHC RBC AUTO-ENTMCNC: 31.7 G/DL (ref 31.4–37.4)
MCV RBC AUTO: 84 FL (ref 82–98)
MONOCYTES # BLD AUTO: 0.63 THOUSAND/ΜL (ref 0.17–1.22)
MONOCYTES NFR BLD AUTO: 9 % (ref 4–12)
NEUTROPHILS # BLD AUTO: 2.44 THOUSANDS/ΜL (ref 1.85–7.62)
NEUTS SEG NFR BLD AUTO: 36 % (ref 43–75)
NRBC BLD AUTO-RTO: 0 /100 WBCS
PLATELET # BLD AUTO: 331 THOUSANDS/UL (ref 149–390)
PMV BLD AUTO: 10 FL (ref 8.9–12.7)
POTASSIUM SERPL-SCNC: 4.2 MMOL/L (ref 3.5–5.3)
RBC # BLD AUTO: 5.39 MILLION/UL (ref 3.88–5.62)
SODIUM SERPL-SCNC: 137 MMOL/L (ref 136–145)
WBC # BLD AUTO: 6.76 THOUSAND/UL (ref 4.31–10.16)

## 2021-01-04 PROCEDURE — 36415 COLL VENOUS BLD VENIPUNCTURE: CPT

## 2021-01-04 PROCEDURE — 85025 COMPLETE CBC W/AUTO DIFF WBC: CPT

## 2021-01-04 PROCEDURE — 80048 BASIC METABOLIC PNL TOTAL CA: CPT

## 2021-01-06 RX ORDER — MULTIVIT-MIN/IRON FUM/FOLIC AC 7.5 MG-4
1 TABLET ORAL DAILY
COMMUNITY

## 2021-01-06 NOTE — PRE-PROCEDURE INSTRUCTIONS
Pre-Surgery Instructions:   Medication Instructions    fluticasone (FLONASE) 50 mcg/act nasal spray Instructed patient per Anesthesia Guidelines   Multiple Vitamins-Minerals (multivitamin with minerals) tablet Patient was instructed by Physician and understands  Pre-op medication, and showering instructions with antibacteral soap reviewed  Instructed to avoid all ASA/and OTC Vit/Supp 1 week prior to surgery and to avoid NSAIDs 3 days prior to surgery  Instructed to take per normal schedule except DOS  Pt  Verbalized an understanding of all instructions reviewed and offers no concerns at this time  Pt  Verbalized understanding of current visitor restrictions

## 2021-01-08 ENCOUNTER — CONSULT (OUTPATIENT)
Dept: FAMILY MEDICINE CLINIC | Facility: CLINIC | Age: 51
End: 2021-01-08
Payer: COMMERCIAL

## 2021-01-08 VITALS
TEMPERATURE: 98.3 F | WEIGHT: 190.8 LBS | BODY MASS INDEX: 28.92 KG/M2 | OXYGEN SATURATION: 97 % | SYSTOLIC BLOOD PRESSURE: 108 MMHG | DIASTOLIC BLOOD PRESSURE: 70 MMHG | HEART RATE: 83 BPM | HEIGHT: 68 IN

## 2021-01-08 DIAGNOSIS — Z01.818 PREOP GENERAL PHYSICAL EXAM: Primary | ICD-10-CM

## 2021-01-08 DIAGNOSIS — Z12.11 SCREENING FOR COLON CANCER: ICD-10-CM

## 2021-01-08 PROCEDURE — 1036F TOBACCO NON-USER: CPT | Performed by: NURSE PRACTITIONER

## 2021-01-08 PROCEDURE — 99214 OFFICE O/P EST MOD 30 MIN: CPT | Performed by: NURSE PRACTITIONER

## 2021-01-08 PROCEDURE — 3008F BODY MASS INDEX DOCD: CPT | Performed by: NURSE PRACTITIONER

## 2021-01-08 NOTE — PROGRESS NOTES
BMI Counseling: Body mass index is 29 01 kg/m²  The BMI is above normal  Nutrition recommendations include encouraging healthy choices of fruits and vegetables, decreasing fast food intake, consuming healthier snacks, limiting drinks that contain sugar, increasing intake of lean protein, reducing intake of saturated and trans fat and reducing intake of cholesterol  Exercise recommendations include exercising 3-5 times per week  Depression Screening and Follow-up Plan: Clincally patient does not have depression  No treatment is required  Assessment/Plan:    Preop general physical exam  Presently clinically stable for scheduled sinus surgery on 1/11/21  Avoidance of; Aspirin, Ibuprofen, Naproxen, and other NSAIDS 5-7 days prior to surgery  To call with any changes in present status  EKG done in office with NSR with no significant changes and reviewed with Dr Eri Vick  Labs reviewed and normal         Diagnoses and all orders for this visit:    Preop general physical exam    Screening for colon cancer  -     Cologuard; Future          Subjective:      Patient ID: Pan Kennedy is a 45 y o  male  45year old male patient here for clearance for sinus surgery on 1/11/21  Patient had labs done and normal  Has suffered from chronic sinus infections  The following portions of the patient's history were reviewed and updated as appropriate: allergies, current medications, past family history, past medical history, past social history, past surgical history and problem list     Review of Systems   Constitutional: Negative  Negative for appetite change, fatigue and fever  HENT: Positive for sinus pressure (chronic)  Negative for congestion, ear pain, postnasal drip, rhinorrhea, sore throat and voice change  Eyes: Negative  Respiratory: Negative  Negative for cough, chest tightness, shortness of breath and wheezing  Cardiovascular: Negative  Negative for chest pain and palpitations  Gastrointestinal: Negative  Negative for abdominal distention  Endocrine: Negative  Genitourinary: Negative  Negative for difficulty urinating  Musculoskeletal: Negative  Negative for arthralgias  Skin: Negative  Negative for color change and rash  Allergic/Immunologic: Negative  Neurological: Negative  Negative for dizziness and headaches  Hematological: Negative  Does not bruise/bleed easily  Psychiatric/Behavioral: Negative  Objective:      /70 (BP Location: Left arm, Patient Position: Sitting, Cuff Size: Adult)   Pulse 83   Temp 98 3 °F (36 8 °C) (Oral)   Ht 5' 8" (1 727 m)   Wt 86 5 kg (190 lb 12 8 oz)   SpO2 97%   BMI 29 01 kg/m²          Physical Exam  Vitals signs and nursing note reviewed  Constitutional:       General: He is not in acute distress  Appearance: Normal appearance  He is obese  He is not ill-appearing  HENT:      Head: Normocephalic and atraumatic  Right Ear: Tympanic membrane and external ear normal       Left Ear: Tympanic membrane and external ear normal       Nose: Nose normal  No congestion or rhinorrhea  Right Turbinates: Enlarged  Left Turbinates: Enlarged  Mouth/Throat:      Mouth: Mucous membranes are moist       Pharynx: Oropharynx is clear  No oropharyngeal exudate or posterior oropharyngeal erythema  Eyes:      Conjunctiva/sclera: Conjunctivae normal    Neck:      Musculoskeletal: Normal range of motion and neck supple  No neck rigidity or muscular tenderness  Cardiovascular:      Rate and Rhythm: Normal rate and regular rhythm  Pulses: Normal pulses  Heart sounds: Normal heart sounds  No murmur  No friction rub  No gallop  Pulmonary:      Effort: Pulmonary effort is normal  No respiratory distress  Breath sounds: Normal breath sounds  Abdominal:      General: Bowel sounds are normal  There is no distension  Palpations: Abdomen is soft  Musculoskeletal: Normal range of motion  General: No tenderness  Skin:     General: Skin is warm and dry  Capillary Refill: Capillary refill takes less than 2 seconds  Neurological:      General: No focal deficit present  Mental Status: He is alert and oriented to person, place, and time     Psychiatric:         Mood and Affect: Mood normal          Behavior: Behavior normal

## 2021-01-08 NOTE — ASSESSMENT & PLAN NOTE
Presently clinically stable for scheduled sinus surgery on 1/11/21  Avoidance of; Aspirin, Ibuprofen, Naproxen, and other NSAIDS 5-7 days prior to surgery  To call with any changes in present status  EKG done in office with NSR with no significant changes and reviewed with Dr Alma Kumar    Labs reviewed and normal

## 2021-01-11 ENCOUNTER — ANESTHESIA (OUTPATIENT)
Dept: PERIOP | Facility: AMBULARY SURGERY CENTER | Age: 51
End: 2021-01-11
Payer: COMMERCIAL

## 2021-01-11 ENCOUNTER — HOSPITAL ENCOUNTER (OUTPATIENT)
Facility: AMBULARY SURGERY CENTER | Age: 51
Setting detail: OUTPATIENT SURGERY
Discharge: HOME | End: 2021-01-11
Attending: OTOLARYNGOLOGY | Admitting: OTOLARYNGOLOGY
Payer: COMMERCIAL

## 2021-01-11 VITALS
BODY MASS INDEX: 29.1 KG/M2 | HEIGHT: 68 IN | DIASTOLIC BLOOD PRESSURE: 101 MMHG | WEIGHT: 192 LBS | TEMPERATURE: 97.1 F | RESPIRATION RATE: 18 BRPM | SYSTOLIC BLOOD PRESSURE: 160 MMHG | HEART RATE: 93 BPM | OXYGEN SATURATION: 99 %

## 2021-01-11 VITALS — HEART RATE: 84 BPM

## 2021-01-11 DIAGNOSIS — Z98.890 STATUS POST FUNCTIONAL ENDOSCOPIC SINUS SURGERY: Primary | ICD-10-CM

## 2021-01-11 DIAGNOSIS — J34.3 NASAL TURBINATE HYPERTROPHY: ICD-10-CM

## 2021-01-11 DIAGNOSIS — J34.89 NASAL VALVE COLLAPSE: ICD-10-CM

## 2021-01-11 DIAGNOSIS — J32.0 CHRONIC MAXILLARY SINUSITIS: ICD-10-CM

## 2021-01-11 DIAGNOSIS — J34.2 DEVIATED NASAL SEPTUM: ICD-10-CM

## 2021-01-11 PROCEDURE — 30802 ABLATE INF TURBINATE SUBMUC: CPT | Performed by: OTOLARYNGOLOGY

## 2021-01-11 PROCEDURE — 30465 REPAIR NASAL STENOSIS: CPT | Performed by: OTOLARYNGOLOGY

## 2021-01-11 PROCEDURE — 87070 CULTURE OTHR SPECIMN AEROBIC: CPT | Performed by: OTOLARYNGOLOGY

## 2021-01-11 PROCEDURE — 30520 REPAIR OF NASAL SEPTUM: CPT | Performed by: OTOLARYNGOLOGY

## 2021-01-11 PROCEDURE — 87075 CULTR BACTERIA EXCEPT BLOOD: CPT | Performed by: OTOLARYNGOLOGY

## 2021-01-11 PROCEDURE — 31267 ENDOSCOPY MAXILLARY SINUS: CPT | Performed by: OTOLARYNGOLOGY

## 2021-01-11 PROCEDURE — 87102 FUNGUS ISOLATION CULTURE: CPT | Performed by: OTOLARYNGOLOGY

## 2021-01-11 PROCEDURE — 87205 SMEAR GRAM STAIN: CPT | Performed by: OTOLARYNGOLOGY

## 2021-01-11 PROCEDURE — 31255 NSL/SINS NDSC W/TOT ETHMDCT: CPT | Performed by: OTOLARYNGOLOGY

## 2021-01-11 RX ORDER — HYDROCODONE BITARTRATE AND ACETAMINOPHEN 5; 325 MG/1; MG/1
1 TABLET ORAL EVERY 4 HOURS PRN
Qty: 20 TABLET | Refills: 0 | Status: SHIPPED | OUTPATIENT
Start: 2021-01-11

## 2021-01-11 RX ORDER — PROPOFOL 10 MG/ML
INJECTION, EMULSION INTRAVENOUS AS NEEDED
Status: DISCONTINUED | OUTPATIENT
Start: 2021-01-11 | End: 2021-01-11

## 2021-01-11 RX ORDER — ONDANSETRON 2 MG/ML
4 INJECTION INTRAMUSCULAR; INTRAVENOUS EVERY 6 HOURS PRN
Status: DISCONTINUED | OUTPATIENT
Start: 2021-01-11 | End: 2021-01-11 | Stop reason: HOSPADM

## 2021-01-11 RX ORDER — MAGNESIUM HYDROXIDE 1200 MG/15ML
LIQUID ORAL AS NEEDED
Status: DISCONTINUED | OUTPATIENT
Start: 2021-01-11 | End: 2021-01-11 | Stop reason: HOSPADM

## 2021-01-11 RX ORDER — ONDANSETRON 2 MG/ML
INJECTION INTRAMUSCULAR; INTRAVENOUS AS NEEDED
Status: DISCONTINUED | OUTPATIENT
Start: 2021-01-11 | End: 2021-01-11

## 2021-01-11 RX ORDER — NEOSTIGMINE METHYLSULFATE 1 MG/ML
INJECTION INTRAVENOUS AS NEEDED
Status: DISCONTINUED | OUTPATIENT
Start: 2021-01-11 | End: 2021-01-11

## 2021-01-11 RX ORDER — MIDAZOLAM HYDROCHLORIDE 2 MG/2ML
INJECTION, SOLUTION INTRAMUSCULAR; INTRAVENOUS AS NEEDED
Status: DISCONTINUED | OUTPATIENT
Start: 2021-01-11 | End: 2021-01-11

## 2021-01-11 RX ORDER — HYDROCODONE BITARTRATE AND ACETAMINOPHEN 5; 325 MG/1; MG/1
2 TABLET ORAL EVERY 4 HOURS PRN
Status: DISCONTINUED | OUTPATIENT
Start: 2021-01-11 | End: 2021-01-11 | Stop reason: HOSPADM

## 2021-01-11 RX ORDER — EPHEDRINE SULFATE 50 MG/ML
INJECTION INTRAVENOUS AS NEEDED
Status: DISCONTINUED | OUTPATIENT
Start: 2021-01-11 | End: 2021-01-11

## 2021-01-11 RX ORDER — PREDNISONE 10 MG/1
TABLET ORAL
Qty: 30 TABLET | Refills: 0 | Status: SHIPPED | OUTPATIENT
Start: 2021-01-11

## 2021-01-11 RX ORDER — FENTANYL CITRATE/PF 50 MCG/ML
25 SYRINGE (ML) INJECTION
Status: COMPLETED | OUTPATIENT
Start: 2021-01-11 | End: 2021-01-11

## 2021-01-11 RX ORDER — SODIUM CHLORIDE, SODIUM LACTATE, POTASSIUM CHLORIDE, CALCIUM CHLORIDE 600; 310; 30; 20 MG/100ML; MG/100ML; MG/100ML; MG/100ML
125 INJECTION, SOLUTION INTRAVENOUS CONTINUOUS
Status: DISCONTINUED | OUTPATIENT
Start: 2021-01-11 | End: 2021-01-11 | Stop reason: HOSPADM

## 2021-01-11 RX ORDER — SODIUM CHLORIDE, SODIUM LACTATE, POTASSIUM CHLORIDE, CALCIUM CHLORIDE 600; 310; 30; 20 MG/100ML; MG/100ML; MG/100ML; MG/100ML
INJECTION, SOLUTION INTRAVENOUS CONTINUOUS PRN
Status: DISCONTINUED | OUTPATIENT
Start: 2021-01-11 | End: 2021-01-11

## 2021-01-11 RX ORDER — DEXAMETHASONE SODIUM PHOSPHATE 4 MG/ML
INJECTION, SOLUTION INTRA-ARTICULAR; INTRALESIONAL; INTRAMUSCULAR; INTRAVENOUS; SOFT TISSUE AS NEEDED
Status: DISCONTINUED | OUTPATIENT
Start: 2021-01-11 | End: 2021-01-11

## 2021-01-11 RX ORDER — FENTANYL CITRATE 50 UG/ML
INJECTION, SOLUTION INTRAMUSCULAR; INTRAVENOUS AS NEEDED
Status: DISCONTINUED | OUTPATIENT
Start: 2021-01-11 | End: 2021-01-11

## 2021-01-11 RX ORDER — ONDANSETRON 2 MG/ML
4 INJECTION INTRAMUSCULAR; INTRAVENOUS ONCE AS NEEDED
Status: DISCONTINUED | OUTPATIENT
Start: 2021-01-11 | End: 2021-01-11 | Stop reason: HOSPADM

## 2021-01-11 RX ORDER — ROCURONIUM BROMIDE 10 MG/ML
INJECTION, SOLUTION INTRAVENOUS AS NEEDED
Status: DISCONTINUED | OUTPATIENT
Start: 2021-01-11 | End: 2021-01-11

## 2021-01-11 RX ORDER — CEPHALEXIN 500 MG/1
500 CAPSULE ORAL EVERY 8 HOURS SCHEDULED
Qty: 40 CAPSULE | Refills: 0 | Status: SHIPPED | OUTPATIENT
Start: 2021-01-11 | End: 2021-01-24

## 2021-01-11 RX ORDER — CEFAZOLIN SODIUM 2 G/50ML
2000 SOLUTION INTRAVENOUS ONCE
Status: COMPLETED | OUTPATIENT
Start: 2021-01-11 | End: 2021-01-11

## 2021-01-11 RX ORDER — LIDOCAINE HYDROCHLORIDE AND EPINEPHRINE 10; 10 MG/ML; UG/ML
INJECTION, SOLUTION INFILTRATION; PERINEURAL AS NEEDED
Status: DISCONTINUED | OUTPATIENT
Start: 2021-01-11 | End: 2021-01-11 | Stop reason: HOSPADM

## 2021-01-11 RX ORDER — LIDOCAINE HYDROCHLORIDE 10 MG/ML
INJECTION, SOLUTION EPIDURAL; INFILTRATION; INTRACAUDAL; PERINEURAL AS NEEDED
Status: DISCONTINUED | OUTPATIENT
Start: 2021-01-11 | End: 2021-01-11

## 2021-01-11 RX ORDER — OXYMETAZOLINE HYDROCHLORIDE 0.05 G/100ML
SPRAY NASAL AS NEEDED
Status: DISCONTINUED | OUTPATIENT
Start: 2021-01-11 | End: 2021-01-11 | Stop reason: HOSPADM

## 2021-01-11 RX ORDER — GLYCOPYRROLATE 0.2 MG/ML
INJECTION INTRAMUSCULAR; INTRAVENOUS AS NEEDED
Status: DISCONTINUED | OUTPATIENT
Start: 2021-01-11 | End: 2021-01-11

## 2021-01-11 RX ORDER — SODIUM CHLORIDE/ALOE VERA
GEL (GRAM) NASAL AS NEEDED
Status: DISCONTINUED | OUTPATIENT
Start: 2021-01-11 | End: 2021-01-11 | Stop reason: HOSPADM

## 2021-01-11 RX ADMIN — NEOSTIGMINE METHYLSULFATE 3 MG: 1 INJECTION, SOLUTION INTRAMUSCULAR; INTRAVENOUS; SUBCUTANEOUS at 09:51

## 2021-01-11 RX ADMIN — FENTANYL CITRATE 25 MCG: 50 INJECTION INTRAMUSCULAR; INTRAVENOUS at 10:26

## 2021-01-11 RX ADMIN — CEFAZOLIN SODIUM 2000 MG: 2 SOLUTION INTRAVENOUS at 08:18

## 2021-01-11 RX ADMIN — DEXAMETHASONE SODIUM PHOSPHATE 4 MG: 4 INJECTION INTRA-ARTICULAR; INTRALESIONAL; INTRAMUSCULAR; INTRAVENOUS; SOFT TISSUE at 07:55

## 2021-01-11 RX ADMIN — MIDAZOLAM HYDROCHLORIDE 2 MG: 1 INJECTION, SOLUTION INTRAMUSCULAR; INTRAVENOUS at 07:41

## 2021-01-11 RX ADMIN — SODIUM CHLORIDE, SODIUM LACTATE, POTASSIUM CHLORIDE, AND CALCIUM CHLORIDE: .6; .31; .03; .02 INJECTION, SOLUTION INTRAVENOUS at 07:41

## 2021-01-11 RX ADMIN — FENTANYL CITRATE 25 MCG: 50 INJECTION INTRAMUSCULAR; INTRAVENOUS at 10:31

## 2021-01-11 RX ADMIN — FENTANYL CITRATE 25 MCG: 50 INJECTION INTRAMUSCULAR; INTRAVENOUS at 10:41

## 2021-01-11 RX ADMIN — EPHEDRINE SULFATE 5 MG: 50 INJECTION, SOLUTION INTRAVENOUS at 08:24

## 2021-01-11 RX ADMIN — ONDANSETRON 4 MG: 2 INJECTION INTRAMUSCULAR; INTRAVENOUS at 07:41

## 2021-01-11 RX ADMIN — GLYCOPYRROLATE 0.4 MG: 0.2 INJECTION, SOLUTION INTRAMUSCULAR; INTRAVENOUS at 09:51

## 2021-01-11 RX ADMIN — ROCURONIUM BROMIDE 50 MG: 10 SOLUTION INTRAVENOUS at 07:46

## 2021-01-11 RX ADMIN — PROPOFOL 150 MG: 10 INJECTION, EMULSION INTRAVENOUS at 07:46

## 2021-01-11 RX ADMIN — LIDOCAINE HYDROCHLORIDE 50 MG: 10 INJECTION, SOLUTION EPIDURAL; INFILTRATION; INTRACAUDAL at 07:46

## 2021-01-11 RX ADMIN — FENTANYL CITRATE 25 MCG: 50 INJECTION INTRAMUSCULAR; INTRAVENOUS at 10:36

## 2021-01-11 RX ADMIN — FENTANYL CITRATE 50 MCG: 50 INJECTION, SOLUTION INTRAMUSCULAR; INTRAVENOUS at 08:14

## 2021-01-11 RX ADMIN — FENTANYL CITRATE 50 MCG: 50 INJECTION, SOLUTION INTRAMUSCULAR; INTRAVENOUS at 07:46

## 2021-01-11 NOTE — ANESTHESIA PREPROCEDURE EVALUATION
Procedure:  SEPTOPLASTY (Bilateral Nose)  TURBINECTOMY (N/A Nose)  FUNCTIONAL ENDOSCOPIC SINUS SURGERY (FESS), MAXILLARY ANTROSTOMY,   GRAFTS (N/A Nose)    Relevant Problems   No relevant active problems        Physical Exam    Airway    Mallampati score: II  TM Distance: >3 FB  Neck ROM: full     Dental   No notable dental hx     Cardiovascular  Cardiovascular exam normal    Pulmonary  Pulmonary exam normal     Other Findings        Anesthesia Plan  ASA Score- 2     Anesthesia Type- general with ASA Monitors  Additional Monitors:   Airway Plan: ETT  Plan Factors-Exercise tolerance (METS): >4 METS  Chart reviewed  Imaging results reviewed  Existing labs reviewed  Patient summary reviewed  Patient is not a current smoker  Induction- intravenous  Postoperative Plan- Plan for postoperative opioid use  Informed Consent- Anesthetic plan and risks discussed with patient  I personally reviewed this patient with the CRNA  Discussed and agreed on the Anesthesia Plan with the CRNA  Car Mcnamara

## 2021-01-11 NOTE — DISCHARGE INSTRUCTIONS
Septoplasty   WHAT YOU NEED TO KNOW:   Septoplasty is surgery to repair or straighten your nasal septum  The nasal septum is the cartilage and bone that forms a wall to separate your nostrils  Septoplasty may relieve symptoms such as dry mouth and trouble breathing or sleeping  Septoplasty is most commonly done in adults, but may also be done in children  DISCHARGE INSTRUCTIONS:   Seek care immediately if:   · You have trouble breathing  · Clear fluid comes out of your nose when you bend your head forward  · Your heart is beating fast or has an irregular rhythm  · Your nose or the roof of your mouth is pale or starting to turn black  · You have severe pain  · You have red streaks on the skin around your nose  Contact your healthcare provider if:   · Your symptoms do not improve within 1 week  · Your nose bleeds more than you were told to expect  · You have a fever  · Your nose is red, swollen, and draining pus  · Your upper teeth, gum, or nose is numb  · Your sense of smell or taste is different than before surgery  · You have a change in your vision  · You have questions or concerns about your condition or care  Medicines:   · Medicines  may be given to help decrease pain and prevent infection  You may also need nose sprays to keep your nose moist and decrease swelling and congestion  · Take your medicine as directed  Contact your healthcare provider if you think your medicine is not helping or if you have side effects  Tell him or her if you are allergic to any medicine  Keep a list of the medicines, vitamins, and herbs you take  Include the amounts, and when and why you take them  Bring the list or the pill bottles to follow-up visits  Carry your medicine list with you in case of an emergency  Do not blow your nose: The increase in pressure can cause bruising, swelling, and bleeding  Try not to sneeze   If you have to sneeze, keep your mouth open to decrease pressure in your nose  Apply ice:  Apply ice on your nose for 15 to 20 minutes every hour for the first day  Use an ice pack, or put crushed ice in a plastic bag  Cover it with a towel  Ice helps prevent tissue damage and decreases swelling and pain  Elevate your head and upper back:  Keep your head and upper back elevated when you rest, such as in a recliner  Place extra pillows under your head and neck when you sleep in bed  Elevation will help decrease swelling  Self-care:   · Use a cool mist humidifier  A cool mist humidifier will increase air moisture in your home  This will help keep your nose and throat moist and prevent irritation  · Limit activity for 3 days or as directed  Do not lift objects over 20 pounds  Ask when you can return to your usual daily activities  · Care for your wound as directed  You may be able to use saltwater or hydrogen peroxide to remove crusts  If you have a splint, do not get it wet or try to remove it  · Do not smoke for at least 2 days after your surgery  Smoke can irritate your nose and delay healing  Nicotine and other chemicals in cigarettes and cigars can also cause lung damage  Ask your healthcare provider for information if you currently smoke and need help to quit  E-cigarettes or smokeless tobacco still contain nicotine  Talk to your healthcare provider before you use these products  Follow up with your healthcare provider as directed: You may need to return to have your gauze or splint removed  Write down your questions so you remember to ask them during your visits  © Copyright [address] Information is for End User's use only and may not be sold, redistributed or otherwise used for commercial purposes  All illustrations and images included in CareNotes® are the copyrighted property of [employer]  or [facility] Matty Dutton   The above information is an  only   It is not intended as medical advice for individual conditions or treatments  Talk to your doctor, nurse or pharmacist before following any medical regimen to see if it is safe and effective for you  Septoplastia   LO QUE NECESITA SABER:   La septoplastia es aracelis cirugía para reparar o enderezar el tabique nasal  El tabique nasal es el cartílago y el hueso que nader la pared que separa judi fosas nasales  La septoplastia puede aliviar síntomas lety la sequedad en la boca y la dificultad para respirar o dormir  La septoplastia se realiza con mayor frecuencia en los adultos, candelaria también podría realizarse en niños  INSTRUCCIONES SOBRE [facility]:   Busque atención médica de inmediato si:  · Usted tiene dificultad para respirar  · Líquido jody sale de orona nariz cuando usted inclina orona reba hacia adelante  · Orona corazón está latiendo acelerado o tiene un ritmo irregular  · Orona nariz o el paladar de orona boca está pálido o se empieza a poner shira  · Usted tiene dolor intenso  · Usted tiene nasima rojizas en la piel alrededor de la nariz  Comuníquese con orona médico si:  · Judi síntomas no mejoran dentro de 1 semana  · Orona nariz sangra más de lo que le indicaron  · Tiene fiebre  · Orona nariz está jasson, inflamada y drena pus  · Judi dientes superiores, encías o nariz están entumecidos  · Judi sentidos del [employer] o gusto están distintos a lety derian antes de la cirugía  · Usted tiene un cambio en la vista  · Usted tiene preguntas o inquietudes acerca de orona condición o cuidado  Medicamentos:  · Los medicamentos podrían administrarse para ayudar a disminuir el dolor y prevenir infecciones  Es probable que usted también necesite de [employer] nasales para humedecer orona nariz y disminuir la inflamación y la congestión  · [place] judi medicamentos lety se le haya indicado  Consulte con orona médico si usted sabina que orona medicamento no le está ayudando o si presenta efectos secundarios  Infórmele si es alérgico a cualquier medicamento   Cherie Sagastume actualizada de [employer], las vitaminas y los productos herbales que bharat  Incluya los siguientes datos de los medicamentos: cantidad, frecuencia y motivo de administración  Traiga con usted la lista o los envases de las píldoras a yeni citas de seguimiento  Lleve la lista de los medicamentos con usted en wilbur de aracelis emergencia  No se suene la nariz: El aumento de presión puede causar moretones, inflamación y sangrado  Trate de no estornudar  Si tiene que estornudar, eddie la boca para disminuir la presión en orona nariz  Aplique hielo: El primer día aplique hielo en orona Manju Lui 15 a 20 minutos cada hora  Use aracelis compresa de hielo o ponga hielo triturado en aracelis bolsa de plástico  Cleda Tae con aracelis toalla  El hielo ayuda a evitar daño al tejido y a disminuir la inflamación y el dolor  Eleve la reba y la parte superior de la espalda: Mantenga orona reba y parte superior de orona espalda elevadas cuando descansa, use un sillón reclinable  Coloque almohadas adicionales debajo de orona reba y noble cuando duerma en la cama  La elevación ayudará a disminuir la inflamación  Cuidados personales:  · Use un humidificador de vapor frío  Un humidificador de katerina frío aumentará el nivel de humedad en orona hogar  [place] ayuda a mantener orona nariz y garganta humedecidas y a evitar la irritación  · Limite la actividad por 3 neida según las indicaciones  No levante objetos que pesen más de 20 libras  Pregunte cuándo puede regresar a yeni actividades cotidianas  · Siga las instrucciones de orona médico sobre el cuidado de yeni heridas  Es posible que pueda utilizar agua salada o agua oxigenada para retirar las costras  En wilbur de tener aracelis férula no trate de quitársela, ni la moje  · No fume al menos 2 días después de orona [place]  El humo del cigarrillo puede irritar orona nariz y demorar la cicatrización  La nicotina y otros químicos de los cigarrillos y cigarros también pueden provocar daño en los pulmones   Minnette Edinger a orona médico si usted actualmente fuma y necesita ayuda para dejar de fumar  Los cigarrillos electrónicos o el tabaco sin humo igualmente contienen nicotina  Consulte con orona médico antes de [employer]  Acuda a yeni consultas de control con orona médico según le indicaron  Es posible que deba regresar para que le retiren la gasa o la férula  Anote yeni preguntas para que se acuerde de hacerlas marco yeni visitas  © [address] Information is for End User's use only and may not be sold, redistributed or otherwise used for commercial purposes  All illustrations and images included in CareNotes® are the copyrighted property of [employer]  or [address] [place] [address] es sólo para uso en educación  Orona intención no es darle un consejo médico sobre enfermedades o tratamientos  Colsulte con orona Anibal Dings farmacéutico antes de seguir cualquier régimen médico para saber si es seguro y efectivo para usted

## 2021-01-11 NOTE — OP NOTE
OPERATIVE REPORT  PATIENT NAME: Jaclyn Angulo    :  1970  MRN: [id number]  Pt Location: [facility] OR ROOM 05    SURGERY DATE: 2021    Surgeon(s) and Role:     Kristal Mahajan MD - Primary    Preop Diagnosis:  Deviated nasal septum [J34 2]  Chronic maxillary sinusitis [J32 0]  Nasal valve collapse [J34 89]  Nasal turbinate hypertrophy [J34 3]    Post-Op Diagnosis Codes:     * Deviated nasal septum [J34 2]     * Chronic maxillary sinusitis [J32 0]     * Nasal valve collapse [J34 89]     * Nasal turbinate hypertrophy [J34 3]    Procedure(s) (LRB):  SEPTOPLASTY (Bilateral)  TURBINECTOMY (N/A)  FUNCTIONAL ENDOSCOPIC SINUS SURGERY (FESS), MAXILLARY ANTROSTOMY,   GRAFTS (N/A)    Specimen(s):  ID Type Source Tests Collected by Time Destination   A : right maxillary sinus culture Tissue Maxillary Sinus ANAEROBIC CULTURE AND GRAM STAIN, FUNGAL CULTURE, CULTURE, TISSUE AND GRAM STAIN, WOUND CULTURE Mami Hinds MD 2021 [id number]        Estimated Blood Loss:   Minimal    Drains:  * No LDAs found *    Anesthesia Type:   General    Operative Indications:  Deviated nasal septum [J34 2]  Chronic maxillary sinusitis [J32 0]  Nasal valve collapse [J34 89]  Nasal turbinate hypertrophy [J34 3]      Operative Findings:  Purulent discharge the middle meatus right side, significant erythema and edema of the mucosa of the middle meatus  Bulging medial wall of maxillary sinus into the middle meatus  Maxillary sinus with severe erythema and abundant purulent discharge  Anterior ethmoid cells with polypoid edema, posterior ethmoid cells clear  Severe septal deviation with spur impacted into the left middle meatus  Left maxillary sinus with mild edema on the infundibulum otherwise healthy mucosa  Hypertrophy of turbinates  Very narrow nasal valve particularly on the left side      Complications:   None    Procedure and Technique:  Patient was met in holding area, positively identified and risks, benefits and alternatives discussed, Patient confirmed informed consent  Postop care reviewed with patient and his wife  The patient was transferred onto the operating table in the supine position  Appropriate monitoring devices were put in place, general anesthesia was administered by anesthesiologist and patient intubated without complications, tube taped in midline  The patient was prepped and draped in the usual clean fashion  Before proceeding further, a time-out was taken during which the patients identification and planned surgical procedure were confirmed  Examination with a speculum confirmed severe obstruction bilaterally, topical oxymetazoline applied with cotton pledgets  The examination done with 0 degree endoscope, confirming the findings described above  Subsequently 1% lidocaine with epinephrine 1:100 000 was injected to head and body of right middle turbinate  The same solution was injected to posterior lateral wall with a spinal needle  After allowing time for anesthetic and vasoconstrictive effect, attention was directed to middle turbinate that was bluntly medialized using the [place]  Severe edema was noticed as well as discharge  The seeker probe was then used to palpate the wall and at a soft spot the probe was advanced into the maxillary sinus and opening dilated immediately noticing a large amount of purulent discharge  The content of the maxillary sinus was suctioned with olive tip suction and recovering in a Lukens trap to be sent to microbiology for culture  Subsequently the microdebrider was used to complete the uncinectomy and enlarge the ostium to complete the antrostomy  The ostium was noted to be large to allow introduction of the 30 degree endoscope and remnants of purulent discharge and severe erythema of the mucosa were noticed  The maxillary sinus was then copiously irrigated with sterile saline until return was clear    In addition examination was completed also with a 70 degree endoscope  A small amount of purulent discharge was noticed to be still attached to the anterior wall  Using a 90 degree angle olive tip cannula the anterior wall was irrigated under pressure with sterile saline until complete removal of the purulent discharge was completed  The surgery continued then with the 0 degree endoscope  The ethmoid cells were noticed to have polypoid severe edema and erythema  Ethmoidectomy was then completed with  straight tip of the debrider using the 0 degree endoscope for visualization removing sequentially bony partitions and severely edematous mucosa  The dissection continued past the basal lamella and the posterior ethmoid cells were noticed to be healthy  The ethmoidectomy was then completed from posterior to anterior neck along the roof that was clearly identified until the facial recess was reached  This last portion of the dissection was completed using a 30 degree endoscope for visualization of the roof  Pledgets with Afrin were then placed on the ethmoid cavity and into the maxillary sinus  Attention was then directed to the septum, 1% lidocaine with 1/100,000 epinephrine was injected to the septum on  right caudal edge  Septoplasty hemitransfixion  incision along the caudal margin of the septum on the right  side was performed with the 15 blade  Mucoperichondrial and mucoperiosteal flaps were elevated with the iris scissors and freer elevator bilaterally, mucoperiosteal  flap was easily elevated posteriorly with freer, basal deviated cartilage was resected with freer, and preserved in saline to be used later in the surgery as source of grafting material  The bony spur posteriorly was resected by cutting above and below with septal Perez scissors and removing septal posterior wedge with Blaesly forceps  The inferior deviated edge of perpendicular plate of ethmoid at chondroethmoidal junction was resected with Jantzen-Temo rongeurs   Caudal edge of septum excised as needed to correct the caudal luxation  Attention was then directed to the left middle meatus now accessible after removal of the septal spur  1% lidocaine with epinephrine 1:100 000 was injected to head and body of  middle turbinate  The same solution was injected to posterior lateral wall with a spinal needle  After allowing time for anesthetic and vasoconstrictive effect, attention was directed to middle turbinate that was bluntly medialized using the [place]  The seeker probe was then used to luxated the uncinate and identified the natural ostium this was then dilated  The antrostomy was enlarged with straight Dylon-Cut forceps and completed with the microdebrider that was used to complete the uncinectomy and enlarge antrostomy  The ostium was noted to be large to allow introduction of the 30 degree endoscope and mucosa was noticed to be relatively healthy except at the area of the uncinate and middle meatus     In addition examination was completed also with a 70 degree endoscope  Inferior turbinates were reduced with coblation and outfracturing them  Bilateral nasal cavities were then re-examined, and the longest nasal speculum could be passed back along the septum on both sides without meeting any resistance  Septal excised cartilage harvested was carved for  grafts that were inset between septal cartilage and upper lateral cartilages and fixed with 4/0 plain gut transfixion stitches,  reinforcing and widening nasal valve bilaterally  The main graft on the left side  The septoplasty incision was closed using  4-0 chromic stitches  The mucoperichondral flaps were sutured with mattress type 4/0 plain gut with small Puma needle  Middle turbinates were also medialized with a 4/0 plain gut stitch,  Silicone septal splints were then placed and sutured with 3/0 Prolene  All counts were correct at the end of the case, and no complications were encountered   The nasopharynx was then suctioned free of blood and secretions  Patient was handed to the anesthesiologist who weaned from anesthesia, and extubated patient  Patient was awakened, and taken to the recovery room in stable condition       I was present for the entire procedure    Patient Disposition:  PACU  and extubated and stable    SIGNATURE: Ortiz Vargas MD  DATE: January 11, 2021  TIME: 10:08 AM

## 2021-01-11 NOTE — H&P
[facility]  Kristopher Lockhart 45 y o  male MRN: [id number]  Encounter: [id number]  Scotty Swanson MD  Office : [phone]  Also available on Tiger Text    Thank you for referring Kristopher Lockhart for an evaluation  My recommendations are included  Please do not hesitate to contact me with any questions you may have  ASSESSMENT AND PLAN:      1  Deviated nasal septum      2  Chronic maxillary sinusitis      3  Nasal valve collapse      4  Nasal turbinate hypertrophy      5  Periapical abscess      6  Mucocele of maxillary sinus       CT scan performed on 10/27/2020  On coronal view, there is contact of the septum and the left lateral wall at the level of the nasal valve  Frontals and ethmoids are well pneumatized  Mild mucosal thickening of floor of left maxillary sinus  Right maxillary sinus with complete opacification  There is a bulge of the medial wall of the maxillary sinus consistent with a mucocele  Septal deviation to the left  Remnants of a prior root canal material into the right maxillary sinus  Periapical abscess is present right maxillary  Strongly suspect odontogenic origin based on CT findings  Recommend septoplasty, turbinoplasty, FESS, bilateral maxillary sinus antrostomy, and  graft  Reviewed risks, benefits, and alternatives  Patient agrees to surgical intervention  Written consent was obtained and he will follow up with surgical scheduling  Also encouraged patient to go to dentist for evaluation of right molar for possible extraction  ______________________________________________________________________    Reason for consultation : Sinus    HPI: Kristopher Lockhart is a 45 y o  male who presents with a 6 year history of frequent rhinorrhea, episodes of sneezing that are intermittent, also intermittent congestion  He has been treated by primary care physician with Nasal sprays without significant improvement    He has not been tested for allergies  No recent imaging either  He does report significant sneezing during pollen season as well as tendency to increased tearing,  Also profuse rhinorrhea  He did have a significant episode of sinusitis with frontal pressure headache that resolved with a course of antibiotics  Of note in interval since last visit , patient had Covid 19 infection, 12/10/20  Symptoms free since 12/23/20  PRIOR VISIT, ENDOSCOPIC EVALUATION :     REVIEW OF SYSTEMS:    Review of systems:  10 Point ROS was performed and negative except as above or otherwise noted in the medical record  Historical Information   Past Medical History:   Diagnosis Date    COVID-19     Covid + 12-19-20    Sinus disorder      Past Surgical History:   Procedure Laterality Date    APPENDECTOMY      APPENDECTOMY       Social History   Social History     Substance and Sexual Activity   Alcohol Use Not Currently    Frequency: Never    Binge frequency: Never     Social History     Substance and Sexual Activity   Drug Use No    Types: Other    Comment: former cocaine user     Social History     Tobacco Use   Smoking Status Never Smoker   Smokeless Tobacco Never Used     Family History   Problem Relation Age of Onset    Breast cancer Maternal Grandmother        Meds/Allergies     No current facility-administered medications for this encounter  Allergies   Allergen Reactions    No Active Allergies          PHYSICAL EXAM:    Blood pressure 131/83, pulse 64, temperature (!) 97 °F (36 1 °C), resp  rate 18, height 5' 8" (1 727 m), weight 87 1 kg (192 lb), SpO2 99 %  Body mass index is 29 19 kg/m²  Constitutional: Oriented to person, place, and time  Well-developed and well-nourished, no apparent distress, non-toxic appearance  Cooperative, able to hear and answer questions without difficulty  Voice: Normal voice quality  Head: Normocephalic, atraumatic  No scars, masses or lesions  Face: Symmetric, no edema, no sinus tenderness    Eyes: Vision grossly intact, extra-ocular movement intact  Right Ear: External ear normal   Auditory canal clear  Tympanic membrane well-appearing, without retraction or scarring  No fluid present  No post-auricular erythema or tenderness  Left Ear: External ear normal   Auditory canal clear  Tympanic membrane well-appearing, without retraction or scarring  No fluid present  No post-auricular erythema or tenderness  Nose: Septum With left deviation, contacts inferior turbinate, Narrow nasal valve  Mucosa moist, turbinates well appearing  No crusting, polyps or discharge evident  Oral cavity: Dentition intact(Partial inferior plate)  Mucosa moist, lips normal   Tongue mobile, floor of mouth normal   Hard palate unremarkable  No masses or lesions  Oropharynx: Uvula is midline, soft palate normal   Unremarkable oropharyngeal inlet  Tonsils 1+ unremarkable  Posterior pharyngeal wall clear  No masses or lesions  Salivary glands:  Parotid glands and submandibular glands symmetric, no enlargement or tenderness  Neck: Normal laryngeal elevation with swallow  Trachea midline  No masses or lesions  No palpable adenopathy  Thyroid: normal in size, unremarkable without tenderness or palpable nodules  Pulmonary/Chest: Normal effort and rate  No respiratory distress  Musculoskeletal: Normal range of motion  Neurological: Cranial nerves 2-12 intact  Skin: Skin is warm and dry  Psychiatric: Normal mood and affect  Nasal endoscopy (10/13/20):     Verbal informed consent obtained  Topical anesthesia and vasoconstriction was applied via nasal spray bilaterally  Once anesthesia vasoconstriction had taken effect a flexible endoscope was advanced on both nasal cavities to the level of the nasopharynx  Left nasal cavity with Severe deviation that completely restricts the access to the middle meatus  Unable to evaluate  Endoscope was then advanced on the right nasal cavity, there is a significant enlargement of right middle turbinate, in addition the middle meatus appears to be occupied by inflammatory polypoid tissue, there is a scant amount of white cloudy discharge  Subsequently the endoscope was advanced to the nasopharynx, no evidence of mucosal lesion or masses on the nasopharynx  The endoscope was then removed without complication patient tolerates procedure  Imaging Studies: I have personally reviewed images on the PACS system: CT sinus scan 10/27/2020  On coronal view, there is contact of the septum and the left lateral wall at the level of the nasal valve  Frontals and ethmoids are well pneumatized  Mild mucosal thickening of floor of left maxillary sinus  Right maxillary sinus with complete opacification  There is a bulge of the medial wall of the maxillary sinus consistent with a mucocele  Septal deviation to the left  Remnants of a prior root canal material popping into the right maxillary sinus  Periapical abscess present in right maxillary

## 2021-01-11 NOTE — ANESTHESIA POSTPROCEDURE EVALUATION
Post-Op Assessment Note    CV Status:  Stable  Pain Score: 0    Pain management: adequate     Mental Status:  Alert and awake   Hydration Status:  Euvolemic   PONV Controlled:  Controlled   Airway Patency:  Patent      Post Op Vitals Reviewed: Yes      Staff: CRNA         No complications documented    86  BP   126   Temp  97 8   Pulse  85   Resp   16   SpO2   98

## 2021-01-13 LAB
BACTERIA SPEC ANAEROBE CULT: NORMAL
BACTERIA WND AEROBE CULT: NORMAL
GRAM STN SPEC: NORMAL
GRAM STN SPEC: NORMAL

## 2021-01-14 ENCOUNTER — TELEPHONE (OUTPATIENT)
Dept: GASTROENTEROLOGY | Facility: CLINIC | Age: 51
End: 2021-01-14

## 2021-02-15 LAB — FUNGUS SPEC CULT: NORMAL

## 2021-02-16 ENCOUNTER — TELEPHONE (OUTPATIENT)
Dept: FAMILY MEDICINE CLINIC | Facility: CLINIC | Age: 51
End: 2021-02-16

## 2021-08-27 ENCOUNTER — TELEPHONE (OUTPATIENT)
Dept: FAMILY MEDICINE CLINIC | Facility: CLINIC | Age: 51
End: 2021-08-27

## 2021-10-14 DIAGNOSIS — Z12.11 SCREENING FOR COLON CANCER: Primary | ICD-10-CM

## (undated) DEVICE — NEEDLE SPINAL 22G X 3.5IN  QUINCKE

## (undated) DEVICE — ANTI-FOG SOLUTION WITH FOAM PAD: Brand: DEVON

## (undated) DEVICE — SUT PROLENE 3-0 SH 36 IN 8522H

## (undated) DEVICE — WAND COBLATION REFLEX ULTRA 45

## (undated) DEVICE — GLOVE SRG BIOGEL ORTHOPEDIC 7

## (undated) DEVICE — TUBING SUCTION 5MM X 12 FT

## (undated) DEVICE — STERILE BETHLEHEM NASAL PACK: Brand: CARDINAL HEALTH

## (undated) DEVICE — SUT PLAIN 4-0 SC-1 18 IN 1828H

## (undated) DEVICE — INTENDED FOR TISSUE SEPARATION, AND OTHER PROCEDURES THAT REQUIRE A SHARP SURGICAL BLADE TO PUNCTURE OR CUT.: Brand: BARD-PARKER ® CARBON RIB-BACK BLADES

## (undated) DEVICE — SUT CHROMIC 4-0 P-3 18 MM 1654G

## (undated) DEVICE — NEEDLE 25G X 1 1/2

## (undated) DEVICE — SPLINT 1527005 10PK PAIR NASAL STD THICK

## (undated) DEVICE — NEURO PATTIES 1/2 X 1 1/2

## (undated) DEVICE — BLADE 1884004HR TRICUT 5PK M4 4MM ROTATE: Brand: TRICUT